# Patient Record
Sex: FEMALE | Race: WHITE | NOT HISPANIC OR LATINO | Employment: OTHER | URBAN - METROPOLITAN AREA
[De-identification: names, ages, dates, MRNs, and addresses within clinical notes are randomized per-mention and may not be internally consistent; named-entity substitution may affect disease eponyms.]

---

## 2017-10-18 ENCOUNTER — OFFICE VISIT (OUTPATIENT)
Dept: RHEUMATOLOGY | Facility: CLINIC | Age: 62
End: 2017-10-18
Payer: COMMERCIAL

## 2017-10-18 VITALS
HEIGHT: 67 IN | DIASTOLIC BLOOD PRESSURE: 86 MMHG | BODY MASS INDEX: 31.58 KG/M2 | WEIGHT: 201.19 LBS | SYSTOLIC BLOOD PRESSURE: 142 MMHG

## 2017-10-18 DIAGNOSIS — R22.9 SUBCUTANEOUS NODULES: Primary | ICD-10-CM

## 2017-10-18 PROCEDURE — 99203 OFFICE O/P NEW LOW 30 MIN: CPT | Mod: ,,, | Performed by: INTERNAL MEDICINE

## 2017-10-18 RX ORDER — HYOSCYAMINE SULFATE 0.12 MG/1
TABLET, ORALLY DISINTEGRATING ORAL
Refills: 3 | COMMUNITY
Start: 2017-09-18 | End: 2019-04-01

## 2017-10-18 RX ORDER — HYDROCORTISONE 25 MG/G
CREAM TOPICAL
COMMUNITY
Start: 2017-08-22 | End: 2023-06-01

## 2017-10-18 NOTE — PROGRESS NOTES
This patient was referred to me by Dr. Scar Mendez for the evaluation of tiny nodules that have appeared on her left anterior thigh and perhaps on the right forearm. These have been present over the last year and the patient feels that they may be slightly more tender than they had been.  There has been no growth of these lesions. They are not now tender. There have been no overlying skin abnormalities.  The patient entirely denies any symptoms of arthritis including any red, hot, swollen, or stiff joints.  The cursory review of systems does not suggest any systemic disease (eg., There has been no weight loss, no fevers, no loss of appetite, etc.) or rheumatologic process.  The superficial examination of the lesions on the left anterior leg demonstrate them to be very tiny, less than 5 mm, freely moveable, and non-tender.    I do not see any reason to evaluate her further for any autoimmune disease.     Instead, I have suggested that if she wishes to get a definitive diagnosis on these apparently benign nodules, that she contact a general surgeon who will be able to perform a biopsy of any one of these areas and give her a definitive diagnosis.    The patient was agreeable to the plan and was informed that she is always welcome to return should something change.

## 2019-04-01 ENCOUNTER — OFFICE VISIT (OUTPATIENT)
Dept: ORTHOPEDICS | Facility: CLINIC | Age: 64
End: 2019-04-01
Payer: MEDICARE

## 2019-04-01 VITALS
BODY MASS INDEX: 31.08 KG/M2 | SYSTOLIC BLOOD PRESSURE: 128 MMHG | HEIGHT: 67 IN | WEIGHT: 198 LBS | HEART RATE: 77 BPM | DIASTOLIC BLOOD PRESSURE: 80 MMHG

## 2019-04-01 DIAGNOSIS — M19.049 HAND ARTHRITIS: Primary | ICD-10-CM

## 2019-04-01 DIAGNOSIS — M79.642 BILATERAL HAND PAIN: ICD-10-CM

## 2019-04-01 DIAGNOSIS — M65.311 TRIGGER THUMB OF BOTH HANDS: ICD-10-CM

## 2019-04-01 DIAGNOSIS — M25.552 LEFT HIP PAIN: ICD-10-CM

## 2019-04-01 DIAGNOSIS — M65.312 TRIGGER THUMB OF BOTH HANDS: ICD-10-CM

## 2019-04-01 DIAGNOSIS — M79.641 BILATERAL HAND PAIN: ICD-10-CM

## 2019-04-01 PROCEDURE — 99203 OFFICE O/P NEW LOW 30 MIN: CPT | Mod: ,,, | Performed by: ORTHOPAEDIC SURGERY

## 2019-04-01 PROCEDURE — 99203 PR OFFICE/OUTPT VISIT, NEW, LEVL III, 30-44 MIN: ICD-10-PCS | Mod: ,,, | Performed by: ORTHOPAEDIC SURGERY

## 2019-04-01 PROCEDURE — 73502 X-RAY EXAM HIP UNI 2-3 VIEWS: CPT | Mod: LT,,, | Performed by: ORTHOPAEDIC SURGERY

## 2019-04-01 PROCEDURE — 73130 X-RAY EXAM OF HAND: CPT | Mod: 50,,, | Performed by: ORTHOPAEDIC SURGERY

## 2019-04-01 PROCEDURE — 73130 PR  X-RAY HAND 3+ VW: ICD-10-PCS | Mod: 50,,, | Performed by: ORTHOPAEDIC SURGERY

## 2019-04-01 PROCEDURE — 73502 PR X-RAY EXAM HIP UNI 2-3 VIEWS: ICD-10-PCS | Mod: LT,,, | Performed by: ORTHOPAEDIC SURGERY

## 2019-04-01 RX ORDER — IBUPROFEN 600 MG/1
600 TABLET ORAL EVERY 8 HOURS PRN
Qty: 60 TABLET | Refills: 2 | Status: SHIPPED | OUTPATIENT
Start: 2019-04-01 | End: 2019-04-21

## 2019-04-01 NOTE — PROGRESS NOTES
Subjective:       Patient ID: Mi Hancock is a 63 y.o. female.    Chief Complaint: Pain of the Left Hip (Left hip pain x 2 years. NO injury); Pain of the Left Hand (Bilateral hand pain x 1 year); and Pain of the Right Hand      History of Present Illness  This lady has a two-year history of left hip pain and x-rays a history of bilateral hand pain. Symptoms on a daily intermittent basis worse with activity.no numbness to hands noticed lumps on fingers    Current Medications  Current Outpatient Medications   Medication Sig Dispense Refill    hydrocortisone 2.5 % cream Apply  topically 2 (two) times daily.      ibuprofen (ADVIL,MOTRIN) 600 MG tablet Take 1 tablet (600 mg total) by mouth every 8 (eight) hours as needed for Pain. 60 tablet 2     No current facility-administered medications for this visit.        Allergies  Review of patient's allergies indicates:  No Known Allergies    Past Medical History  Past Medical History:   Diagnosis Date    Thyroid nodule        Surgical History  Past Surgical History:   Procedure Laterality Date    APPENDECTOMY      HYSTERECTOMY      melanoma Left     flank       Family History:   Family History   Problem Relation Age of Onset    Osteoarthritis Mother     Heart disease Father        Social History:   Social History     Socioeconomic History    Marital status:      Spouse name: Not on file    Number of children: Not on file    Years of education: Not on file    Highest education level: Not on file   Occupational History    Not on file   Social Needs    Financial resource strain: Not on file    Food insecurity:     Worry: Not on file     Inability: Not on file    Transportation needs:     Medical: Not on file     Non-medical: Not on file   Tobacco Use    Smoking status: Former Smoker     Packs/day: 2.00     Years: 10.00     Pack years: 20.00     Types: Cigarettes     Last attempt to quit:      Years since quittin.2    Smokeless tobacco: Never  Used   Substance and Sexual Activity    Alcohol use: Yes     Alcohol/week: 1.2 oz     Types: 2 Glasses of wine per week    Drug use: No    Sexual activity: Not on file   Lifestyle    Physical activity:     Days per week: Not on file     Minutes per session: Not on file    Stress: Not on file   Relationships    Social connections:     Talks on phone: Not on file     Gets together: Not on file     Attends Congregation service: Not on file     Active member of club or organization: Not on file     Attends meetings of clubs or organizations: Not on file     Relationship status: Not on file    Intimate partner violence:     Fear of current or ex partner: Not on file     Emotionally abused: Not on file     Physically abused: Not on file     Forced sexual activity: Not on file   Other Topics Concern    Not on file   Social History Narrative    Not on file       Hospitalization/Major Diagnostic Procedure:     Review of Systems     General/Constitutional:  Chills denies. Fatigue denies. Fever denies. Weight gain denies. Weight loss denies.    Respiratory:  Shortness of breath denies.    Cardiovascular:  Chest pain denies.    Gastrointestinal:  Constipation denies. Diarrhea denies. Nausea denies. Vomiting denies.     Hematology:  Easy bruising denies. Prolonged bleeding denies.     Genitourinary:  Frequent urination denies. Pain in lower back denies. Painful urination denies.     Musculoskeletal:  See HPI for details    Skin:  Rash denies.    Neurologic:  Dizziness denies. Gait abnormalities denies. Seizures denies. Tingling/Numbess denies.    Psychiatric:  Anxiety denies. Depressed mood denies.     Objective:   Vital Signs:   Vitals:    04/01/19 1344   BP: 128/80   Pulse: 77        Physical Exam      General Examination:     Constitutional: The patient is alert and oriented to lace person and time. Mood is pleasant.     Head/Face: Normal facial features normal eyebrows    Eyes: Normal extraocular motion  bilaterally    Lungs: Respirations are equal and unlabored    Gait is coordinated.    Cardiovascular: There are no swelling or varicosities present.    Lymphatic: Negative for adenopathy    Skin: Normal    Neurological: Level of consciousness normal. Oriented to place person and time and situation    Psychiatric: Oriented to time place person and situation    Examination of the hands show moderate tenderness of the DIP joints of both index fingers with slight prominence Norvasc his status normal Phalen's test negative examination shows tenderness of the greater trochanter full range of motion lumbar spine nontender straight leg raising negative moderate tenderness on the volar aspect of the MP joint of both thumbs with mild triggering noted    XRAY Report/ Interpretation : AP pelvis x-ray was taken today. Indications low back pain and/or hip pain. Findings AP pelvis x-ray appears to be normal with no evidence of fractures or significant degenerative disease  AP lateral x-rays both hands taken today show slight irregularity and early arthritic changes in the DIP joints of both index fingers      Assessment:       1. Hand arthritis    2. Bilateral hand pain    3. Left hip pain    4. Trigger thumb of both hands        Plan:       Mi was seen today for pain, pain and pain.    Diagnoses and all orders for this visit:    Hand arthritis    Bilateral hand pain  -     X-Ray Hand 3 View Bilateral    Left hip pain  -     X-Ray Hip 2 or 3 views Left    Trigger thumb of both hands    Other orders  -     ibuprofen (ADVIL,MOTRIN) 600 MG tablet; Take 1 tablet (600 mg total) by mouth every 8 (eight) hours as needed for Pain.         Follow up if symptoms worsen or fail to improve.    Treatment options were discussed she would prefer to be observed I did offer her a steroid injection to the trochanter bursa the left hip that she would prefer to be observed and try anti-inflammatory medication at this time she is from a with  ibuprofen    This note was created using Dragon voice recognition software that occasionally misinterpreted phrases or words.

## 2019-04-01 NOTE — PROGRESS NOTES
Subjective:       Patient ID: Mi Hancock is a 63 y.o. female.    Chief Complaint: Pain of the Left Hip (Left hip pain x 2 years. NO injury); Pain of the Left Hand (Bilateral hand pain x 1 year); and Pain of the Right Hand      History of Present Illness  ***    Current Medications  Current Outpatient Medications   Medication Sig Dispense Refill    hydrocortisone 2.5 % cream Apply  topically 2 (two) times daily.       No current facility-administered medications for this visit.        Allergies  Review of patient's allergies indicates:  No Known Allergies    Past Medical History  Past Medical History:   Diagnosis Date    Thyroid nodule        Surgical History  Past Surgical History:   Procedure Laterality Date    APPENDECTOMY      HYSTERECTOMY      melanoma Left     flank       Family History:   Family History   Problem Relation Age of Onset    Osteoarthritis Mother     Heart disease Father        Social History:   Social History     Socioeconomic History    Marital status:      Spouse name: Not on file    Number of children: Not on file    Years of education: Not on file    Highest education level: Not on file   Occupational History    Not on file   Social Needs    Financial resource strain: Not on file    Food insecurity:     Worry: Not on file     Inability: Not on file    Transportation needs:     Medical: Not on file     Non-medical: Not on file   Tobacco Use    Smoking status: Former Smoker     Packs/day: 2.00     Years: 10.00     Pack years: 20.00     Types: Cigarettes     Last attempt to quit:      Years since quittin.2    Smokeless tobacco: Never Used   Substance and Sexual Activity    Alcohol use: Yes     Alcohol/week: 1.2 oz     Types: 2 Glasses of wine per week    Drug use: No    Sexual activity: Not on file   Lifestyle    Physical activity:     Days per week: Not on file     Minutes per session: Not on file    Stress: Not on file   Relationships    Social  connections:     Talks on phone: Not on file     Gets together: Not on file     Attends Confucianism service: Not on file     Active member of club or organization: Not on file     Attends meetings of clubs or organizations: Not on file     Relationship status: Not on file    Intimate partner violence:     Fear of current or ex partner: Not on file     Emotionally abused: Not on file     Physically abused: Not on file     Forced sexual activity: Not on file   Other Topics Concern    Not on file   Social History Narrative    Not on file       Hospitalization/Major Diagnostic Procedure:     Review of Systems     General/Constitutional:  Chills denies. Fatigue denies. Fever denies. Weight gain denies. Weight loss denies.    Respiratory:  Shortness of breath denies.    Cardiovascular:  Chest pain denies.    Gastrointestinal:  Constipation denies. Diarrhea denies. Nausea denies. Vomiting denies.     Hematology:  Easy bruising denies. Prolonged bleeding denies.     Genitourinary:  Frequent urination denies. Pain in lower back denies. Painful urination denies.     Musculoskeletal:  See HPI for details    Skin:  Rash denies.    Neurologic:  Dizziness denies. Gait abnormalities denies. Seizures denies. Tingling/Numbess denies.    Psychiatric:  Anxiety denies. Depressed mood denies.     Objective:   Vital Signs:   Vitals:    04/01/19 1344   BP: 128/80   Pulse: 77        Physical Exam      General Examination:     Constitutional: The patient is alert and oriented to lace person and time. Mood is pleasant.     Head/Face: Normal facial features normal eyebrows    Eyes: Normal extraocular motion bilaterally    Lungs: Respirations are equal and unlabored    Gait is coordinated.    Cardiovascular: There are no swelling or varicosities present.    Lymphatic: Negative for adenopathy    Skin: Normal    Neurological: Level of consciousness normal. Oriented to place person and time and situation    Psychiatric: Oriented to time place  person and situation    ***  XRAY Report/ Interpretation:***      Assessment:       No diagnosis found.    Plan:       There are no diagnoses linked to this encounter.     No follow-ups on file.    ***    This note was created using Dragon voice recognition software that occasionally misinterpreted phrases or words.

## 2019-04-01 NOTE — PROGRESS NOTES
Subjective:       Patient ID: Mi Hancock is a 63 y.o. female.    Chief Complaint: Pain of the Left Hip (Left hip pain x 2 years. NO injury); Pain of the Left Hand (Bilateral hand pain x 1 year); and Pain of the Right Hand      History of Present Illness  ***    Current Medications  Current Outpatient Medications   Medication Sig Dispense Refill    hydrocortisone 2.5 % cream Apply  topically 2 (two) times daily.       No current facility-administered medications for this visit.        Allergies  Review of patient's allergies indicates:  No Known Allergies    Past Medical History  Past Medical History:   Diagnosis Date    Thyroid nodule        Surgical History  Past Surgical History:   Procedure Laterality Date    APPENDECTOMY      HYSTERECTOMY      melanoma Left     flank       Family History:   Family History   Problem Relation Age of Onset    Osteoarthritis Mother     Heart disease Father        Social History:   Social History     Socioeconomic History    Marital status:      Spouse name: Not on file    Number of children: Not on file    Years of education: Not on file    Highest education level: Not on file   Occupational History    Not on file   Social Needs    Financial resource strain: Not on file    Food insecurity:     Worry: Not on file     Inability: Not on file    Transportation needs:     Medical: Not on file     Non-medical: Not on file   Tobacco Use    Smoking status: Former Smoker     Packs/day: 2.00     Years: 10.00     Pack years: 20.00     Types: Cigarettes     Last attempt to quit:      Years since quittin.2    Smokeless tobacco: Never Used   Substance and Sexual Activity    Alcohol use: Yes     Alcohol/week: 1.2 oz     Types: 2 Glasses of wine per week    Drug use: No    Sexual activity: Not on file   Lifestyle    Physical activity:     Days per week: Not on file     Minutes per session: Not on file    Stress: Not on file   Relationships    Social  connections:     Talks on phone: Not on file     Gets together: Not on file     Attends Alevism service: Not on file     Active member of club or organization: Not on file     Attends meetings of clubs or organizations: Not on file     Relationship status: Not on file    Intimate partner violence:     Fear of current or ex partner: Not on file     Emotionally abused: Not on file     Physically abused: Not on file     Forced sexual activity: Not on file   Other Topics Concern    Not on file   Social History Narrative    Not on file       Hospitalization/Major Diagnostic Procedure:     Review of Systems     General/Constitutional:  Chills denies. Fatigue denies. Fever denies. Weight gain denies. Weight loss denies.    Respiratory:  Shortness of breath denies.    Cardiovascular:  Chest pain denies.    Gastrointestinal:  Constipation denies. Diarrhea denies. Nausea denies. Vomiting denies.     Hematology:  Easy bruising denies. Prolonged bleeding denies.     Genitourinary:  Frequent urination denies. Pain in lower back denies. Painful urination denies.     Musculoskeletal:  See HPI for details    Skin:  Rash denies.    Neurologic:  Dizziness denies. Gait abnormalities denies. Seizures denies. Tingling/Numbess denies.    Psychiatric:  Anxiety denies. Depressed mood denies.     Objective:   Vital Signs:   Vitals:    04/01/19 1344   BP: 128/80   Pulse: 77        Physical Exam      General Examination:     Constitutional: The patient is alert and oriented to lace person and time. Mood is pleasant.     Head/Face: Normal facial features normal eyebrows    Eyes: Normal extraocular motion bilaterally    Lungs: Respirations are equal and unlabored    Gait is coordinated.    Cardiovascular: There are no swelling or varicosities present.    Lymphatic: Negative for adenopathy    Skin: Normal    Neurological: Level of consciousness normal. Oriented to place person and time and situation    Psychiatric: Oriented to time place  person and situation    ***    XRAY Report/ Interpretation : ***      Assessment:       No diagnosis found.    Plan:       There are no diagnoses linked to this encounter.     No follow-ups on file.    ***    This note was created using Dragon voice recognition software that occasionally misinterpreted phrases or words.

## 2019-09-04 DIAGNOSIS — M79.604 RIGHT LEG PAIN: Primary | ICD-10-CM

## 2019-09-04 DIAGNOSIS — Z78.0 MENOPAUSE: ICD-10-CM

## 2019-09-04 DIAGNOSIS — Z12.39 SCREENING BREAST EXAMINATION: Primary | ICD-10-CM

## 2019-09-11 ENCOUNTER — HOSPITAL ENCOUNTER (OUTPATIENT)
Dept: RADIOLOGY | Facility: HOSPITAL | Age: 64
Discharge: HOME | End: 2019-09-11
Attending: FAMILY MEDICINE
Payer: MEDICARE

## 2019-09-11 VITALS — WEIGHT: 198 LBS | HEIGHT: 67 IN | BODY MASS INDEX: 31.08 KG/M2

## 2019-09-11 DIAGNOSIS — Z78.0 MENOPAUSE: ICD-10-CM

## 2019-09-11 DIAGNOSIS — Z12.39 SCREENING BREAST EXAMINATION: ICD-10-CM

## 2019-09-11 DIAGNOSIS — M79.604 RIGHT LEG PAIN: ICD-10-CM

## 2019-09-11 PROCEDURE — 77080 DXA BONE DENSITY AXIAL: CPT | Mod: TC,PO

## 2019-09-11 PROCEDURE — 93971 EXTREMITY STUDY: CPT | Mod: TC,PO,RT

## 2019-09-11 PROCEDURE — 77067 SCR MAMMO BI INCL CAD: CPT | Mod: TC,PO

## 2019-11-26 ENCOUNTER — TELEPHONE (OUTPATIENT)
Dept: OPHTHALMOLOGY | Facility: CLINIC | Age: 64
End: 2019-11-26

## 2019-11-26 NOTE — TELEPHONE ENCOUNTER
----- Message from Gris New sent at 11/26/2019 11:27 AM CST -----  Contact:  office  -please call above patient being referred over to the doctor waiting on a call to schedule appointment thanks.

## 2020-01-08 ENCOUNTER — OFFICE VISIT (OUTPATIENT)
Dept: OPHTHALMOLOGY | Facility: CLINIC | Age: 65
End: 2020-01-08
Payer: MEDICARE

## 2020-01-08 DIAGNOSIS — H18.599: Primary | ICD-10-CM

## 2020-01-08 DIAGNOSIS — H25.13 NUCLEAR SCLEROSIS, BILATERAL: ICD-10-CM

## 2020-01-08 PROCEDURE — 99999 PR PBB SHADOW E&M-EST. PATIENT-LVL II: CPT | Mod: PBBFAC,,, | Performed by: OPHTHALMOLOGY

## 2020-01-08 PROCEDURE — 99212 OFFICE O/P EST SF 10 MIN: CPT | Mod: PBBFAC | Performed by: OPHTHALMOLOGY

## 2020-01-08 PROCEDURE — 92004 PR EYE EXAM, NEW PATIENT,COMPREHESV: ICD-10-PCS | Mod: S$PBB,,, | Performed by: OPHTHALMOLOGY

## 2020-01-08 PROCEDURE — 92004 COMPRE OPH EXAM NEW PT 1/>: CPT | Mod: S$PBB,,, | Performed by: OPHTHALMOLOGY

## 2020-01-08 PROCEDURE — 99999 PR PBB SHADOW E&M-EST. PATIENT-LVL II: ICD-10-PCS | Mod: PBBFAC,,, | Performed by: OPHTHALMOLOGY

## 2020-01-08 RX ORDER — SODIUM CHLORIDE 50 MG/ML
1 SOLUTION/ DROPS OPHTHALMIC
COMMUNITY

## 2020-01-08 NOTE — Clinical Note
January 8, 2020      Iva Mazariegos MD  [address]  [address]  [place] [address]           [facility]  [facility]  [place] [address]  Phone: [phone]  Fax: [phone]          Patient: Mi Hancock   MR Number: [id number]   YOB: 1955   Date of Visit: 1/8/2020       Dear Dr. Iva Mazariegos:    Thank you for referring Mi Hancock to me for evaluation. Attached you will find relevant portions of my assessment and plan of care.    If you have questions, please do not hesitate to call me. I look forward to following Mi Hancock along with you.    Sincerely,    Linda Christensen MD    Enclosure  CC:  No Recipients    If you would like to receive this communication electronically, please contact [email or web address] or [phone] to request more information on [facility] [employer] access.    For providers and/or their staff who would like to refer a patient to [facility], please contact us through our one-stop-shop provider referral line, [facility], at [phone].    If you feel you have received this communication in error or would no longer like to receive these types of communications, please e-mail [email or web address]

## 2020-01-08 NOTE — LETTER
[facility] - Ophthal[facility]  Ophthalmology  [address]  [facility] [address]  Phone: [phone]  Fax: [phone]   January 8, 2020    Iva Mazariegos MD  [address]  [address] [place] [address]    Patient: Mi Hancock   MR Number: [id number]   YOB: 1955   Date of Visit: 1/8/2020       Dear Dr. Mazariegos :    Thank you for referring Mi Hancock to me for evaluation. 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  The endothelium is health with cell counts about 2500 c/mm2  Visual decline likely due to early cataract formation, monitor for now as still correctable to 20/20.     If you have questions, please do not hesitate to call me. I look forward to following Mi Hancock along with you.    Sincerely,        Linda Christensen MD       CC  No Recipients

## 2022-07-12 ENCOUNTER — OFFICE VISIT (OUTPATIENT)
Dept: ALLERGY | Facility: CLINIC | Age: 67
End: 2022-07-12
Payer: MEDICARE

## 2022-07-12 ENCOUNTER — LAB VISIT (OUTPATIENT)
Dept: LAB | Facility: HOSPITAL | Age: 67
End: 2022-07-12
Attending: ALLERGY & IMMUNOLOGY
Payer: MEDICARE

## 2022-07-12 VITALS — WEIGHT: 202.19 LBS | BODY MASS INDEX: 31.73 KG/M2 | HEIGHT: 67 IN

## 2022-07-12 DIAGNOSIS — J33.8 OTHER POLYP OF SINUS: ICD-10-CM

## 2022-07-12 DIAGNOSIS — J33.9 NASAL POLYPOSIS: ICD-10-CM

## 2022-07-12 DIAGNOSIS — R43.8 HYPOSMIA: ICD-10-CM

## 2022-07-12 DIAGNOSIS — J31.0 CHRONIC RHINITIS: Primary | ICD-10-CM

## 2022-07-12 DIAGNOSIS — J31.0 CHRONIC RHINITIS: ICD-10-CM

## 2022-07-12 LAB
BASOPHILS # BLD AUTO: 0.07 K/UL (ref 0–0.2)
BASOPHILS NFR BLD: 0.8 % (ref 0–1.9)
DIFFERENTIAL METHOD: ABNORMAL
EOSINOPHIL # BLD AUTO: 0.3 K/UL (ref 0–0.5)
EOSINOPHIL NFR BLD: 3.1 % (ref 0–8)
ERYTHROCYTE [DISTWIDTH] IN BLOOD BY AUTOMATED COUNT: 13 % (ref 11.5–14.5)
HCT VFR BLD AUTO: 39.6 % (ref 37–48.5)
HGB BLD-MCNC: 12.7 G/DL (ref 12–16)
IMM GRANULOCYTES # BLD AUTO: 0.02 K/UL (ref 0–0.04)
IMM GRANULOCYTES NFR BLD AUTO: 0.2 % (ref 0–0.5)
LYMPHOCYTES # BLD AUTO: 3 K/UL (ref 1–4.8)
LYMPHOCYTES NFR BLD: 36.1 % (ref 18–48)
MCH RBC QN AUTO: 31.4 PG (ref 27–31)
MCHC RBC AUTO-ENTMCNC: 32.1 G/DL (ref 32–36)
MCV RBC AUTO: 98 FL (ref 82–98)
MONOCYTES # BLD AUTO: 0.7 K/UL (ref 0.3–1)
MONOCYTES NFR BLD: 7.9 % (ref 4–15)
NEUTROPHILS # BLD AUTO: 4.3 K/UL (ref 1.8–7.7)
NEUTROPHILS NFR BLD: 51.9 % (ref 38–73)
NRBC BLD-RTO: 0 /100 WBC
PLATELET # BLD AUTO: 302 K/UL (ref 150–450)
PMV BLD AUTO: 10.5 FL (ref 9.2–12.9)
RBC # BLD AUTO: 4.04 M/UL (ref 4–5.4)
WBC # BLD AUTO: 8.33 K/UL (ref 3.9–12.7)

## 2022-07-12 PROCEDURE — 85025 COMPLETE CBC W/AUTO DIFF WBC: CPT | Performed by: ALLERGY & IMMUNOLOGY

## 2022-07-12 PROCEDURE — 36415 COLL VENOUS BLD VENIPUNCTURE: CPT | Mod: PO | Performed by: ALLERGY & IMMUNOLOGY

## 2022-07-12 PROCEDURE — 86003 ALLG SPEC IGE CRUDE XTRC EA: CPT | Mod: 59 | Performed by: ALLERGY & IMMUNOLOGY

## 2022-07-12 PROCEDURE — 99204 OFFICE O/P NEW MOD 45 MIN: CPT | Mod: S$PBB,,, | Performed by: ALLERGY & IMMUNOLOGY

## 2022-07-12 PROCEDURE — 99999 PR PBB SHADOW E&M-EST. PATIENT-LVL III: CPT | Mod: PBBFAC,,, | Performed by: ALLERGY & IMMUNOLOGY

## 2022-07-12 PROCEDURE — 99213 OFFICE O/P EST LOW 20 MIN: CPT | Mod: PBBFAC,PO | Performed by: ALLERGY & IMMUNOLOGY

## 2022-07-12 PROCEDURE — 99999 PR PBB SHADOW E&M-EST. PATIENT-LVL III: ICD-10-PCS | Mod: PBBFAC,,, | Performed by: ALLERGY & IMMUNOLOGY

## 2022-07-12 PROCEDURE — 86003 ALLG SPEC IGE CRUDE XTRC EA: CPT | Performed by: ALLERGY & IMMUNOLOGY

## 2022-07-12 PROCEDURE — 99204 PR OFFICE/OUTPT VISIT, NEW, LEVL IV, 45-59 MIN: ICD-10-PCS | Mod: S$PBB,,, | Performed by: ALLERGY & IMMUNOLOGY

## 2022-07-12 PROCEDURE — 82785 ASSAY OF IGE: CPT | Performed by: ALLERGY & IMMUNOLOGY

## 2022-07-12 RX ORDER — FLUTICASONE PROPIONATE 50 MCG
1 SPRAY, SUSPENSION (ML) NASAL DAILY
COMMUNITY
End: 2022-08-15

## 2022-07-12 RX ORDER — FEXOFENADINE HCL 60 MG
60 TABLET ORAL DAILY
COMMUNITY

## 2022-07-12 RX ORDER — ESTRADIOL 0.1 MG/G
0.5 CREAM VAGINAL
COMMUNITY
Start: 2022-06-15

## 2022-07-12 NOTE — PROGRESS NOTES
"ALLERGY & IMMUNOLOGY CLINIC - INITIAL CONSULTATION      HISTORY OF PRESENT ILLNESS     Patient ID: Mi Hancock is a 66 y.o. female    CC: query allergy    HPI: 65 yo woman presents for initial evaluation, she is self referred.     Since Spring, she has had itchy ears, ear fullness, ear itching, rhinorrhea (clear). Symptoms have been daily since spring. She constantly needs kleenex on hand. If she takes loratadine, she finds that it dries up the mucous and causes sinus congestion and pressure. She has also tried flonase, but if she uses this often, it causes a burning sensation in the nose and sinuses.     She has experienced similar symptoms in the past. She underwent allergy testing twice in the past. At age 20, she was positive to nearly everything (done in [place], [place]). She underwent allergy shots there for several years. This helped alleviate her symptoms. She then moved, and in 1995, she underwent additional allergy testing. She recalls citrus being positive. She again underwent shots but had to stop after a short time due to missing too much work.     Lemon and lime cause rhinorrhea.     No fevers or chills. Intermittent heartburn and gastritis, monitors diet but not on meds. Sense of smell is diminished. Diminished hearing in one ear     MEDICAL HISTORY     MedHx: active problems reviewed  SurgHx: no ENT surgeries  SocHx: dogs at home  FamHx: MGF with allergic rhinitis, brothers with rhinitis  Allergies: NKDA  Medications: MAR reviewed  Vaccines: She has not yet had pneumonia or flu vaccines    H/o Asthma:denies  H/o Eczema: denies  H/o Rhinitis: yes  Oral Allergy:  denies  Food Allergy: avoids citrus, when she handles raw potatoes, her hands turn red  Venom Allergy: denies  Latex Allergy: denies     PHYSICAL EXAM     VS: Ht 5' 7" (1.702 m)   Wt 91.7 kg (202 lb 2.6 oz)   BMI 31.66 kg/m²   GENERAL: alert, NAD, well-appearing, cooperative  EYES: PERRL, EOMI, no conjunctival injection, no discharge, no " infraorbital shiners  EARS: external auditory canals normal B/L, TM normal B/L  NOSE: NT 2+ and pink B/L, no stringing mucous, possible polypoid tissue vs mucus in bilateral nares - patient unable to blow anything out of nose at the time of exam  ORAL: MMM, no ulcers, no thrush, no cobblestoning, low lying palate  NECK: supple, trachea midline, no thyromegaly, no LAD  LUNGS: CTAB, no w/r/c, no increased WOB  HEART: RRR, normal S1/S2, no m/g/r  EXTREMITIES: +2 distal pulses, no c/c/e  LYMPHATICS: no cervical/submandibular LAD  DERM: no rashes, no skin breaks, no dystrophic fingernails  NEURO: normal gait, no facial asymmetry     LABORATORY STUDIES     2021: no eosinophilia, normal CBC, CMP     ALLERGEN TESTING     Skin Prick: done decades ago     ASSESSMENT & PLAN     Mi Hancock is a 66 y.o. female with     Chronic rhinitis  Possible nasal polyposis  Hyposmia  History of positive allergy testing in the past    Plan:   CT scan of sinuses  Continue flonase 1 SEN daily or BID, reviewed proper technique.   Continue loratadine for now (discouraged version with decongestant added)  Immunocaps to aeroallergens, total IgE level, and CBC with diff to look for eosinophilia.   Will titrate meds based on results    Follow up: will contact with results when available    Rupa Stoddard MD  Allergy/Immunology

## 2022-07-13 ENCOUNTER — HOSPITAL ENCOUNTER (OUTPATIENT)
Dept: RADIOLOGY | Facility: HOSPITAL | Age: 67
Discharge: HOME | End: 2022-07-13
Attending: ALLERGY & IMMUNOLOGY
Payer: MEDICARE

## 2022-07-13 DIAGNOSIS — J31.0 CHRONIC RHINITIS: ICD-10-CM

## 2022-07-13 DIAGNOSIS — J33.8 OTHER POLYP OF SINUS: ICD-10-CM

## 2022-07-13 DIAGNOSIS — J33.9 NASAL POLYPOSIS: ICD-10-CM

## 2022-07-13 DIAGNOSIS — R43.8 HYPOSMIA: ICD-10-CM

## 2022-07-13 LAB — IGE SERPL-ACNC: 40 IU/ML (ref 0–100)

## 2022-07-13 PROCEDURE — 70486 CT SINUSES WITHOUT CONTRAST: ICD-10-PCS | Mod: 26,,, | Performed by: RADIOLOGY

## 2022-07-13 PROCEDURE — 70486 CT MAXILLOFACIAL W/O DYE: CPT | Mod: TC

## 2022-07-13 PROCEDURE — 70486 CT MAXILLOFACIAL W/O DYE: CPT | Mod: 26,,, | Performed by: RADIOLOGY

## 2022-07-14 ENCOUNTER — TELEPHONE (OUTPATIENT)
Dept: ALLERGY | Facility: CLINIC | Age: 67
End: 2022-07-14
Payer: MEDICARE

## 2022-07-14 DIAGNOSIS — J32.0 CHRONIC MAXILLARY SINUSITIS: Primary | ICD-10-CM

## 2022-07-14 RX ORDER — AMOXICILLIN AND CLAVULANATE POTASSIUM 875; 125 MG/1; MG/1
1 TABLET, FILM COATED ORAL EVERY 12 HOURS
Qty: 42 TABLET | Refills: 0 | Status: SHIPPED | OUTPATIENT
Start: 2022-07-14 | End: 2022-08-04

## 2022-07-14 NOTE — TELEPHONE ENCOUNTER
Spoke with Mi to review results of CT scan - chronic pansinusitis. No polyps.    Augmentin Rx sent to pharmacy. ENT referral placed.     Will be in touch when allergy bloodwork results.

## 2022-07-15 LAB
A ALTERNATA IGE QN: <0.1 KU/L
A FUMIGATUS IGE QN: <0.1 KU/L
BERMUDA GRASS IGE QN: 1.66 KU/L
CAT DANDER IGE QN: <0.1 KU/L
CEDAR IGE QN: <0.1 KU/L
D FARINAE IGE QN: <0.1 KU/L
D PTERONYSS IGE QN: <0.1 KU/L
DEPRECATED A ALTERNATA IGE RAST QL: NORMAL
DEPRECATED A FUMIGATUS IGE RAST QL: NORMAL
DEPRECATED BERMUDA GRASS IGE RAST QL: ABNORMAL
DEPRECATED CAT DANDER IGE RAST QL: NORMAL
DEPRECATED CEDAR IGE RAST QL: NORMAL
DEPRECATED D FARINAE IGE RAST QL: NORMAL
DEPRECATED D PTERONYSS IGE RAST QL: NORMAL
DEPRECATED DOG DANDER IGE RAST QL: NORMAL
DEPRECATED ELDER IGE RAST QL: ABNORMAL
DEPRECATED ENGL PLANTAIN IGE RAST QL: ABNORMAL
DEPRECATED PECAN/HICK TREE IGE RAST QL: NORMAL
DEPRECATED ROACH IGE RAST QL: NORMAL
DEPRECATED TIMOTHY IGE RAST QL: ABNORMAL
DEPRECATED WEST RAGWEED IGE RAST QL: ABNORMAL
DEPRECATED WHITE OAK IGE RAST QL: ABNORMAL
DOG DANDER IGE QN: <0.1 KU/L
ELDER IGE QN: 0.11 KU/L
ENGL PLANTAIN IGE QN: 0.15 KU/L
PECAN/HICK TREE IGE QN: <0.1 KU/L
ROACH IGE QN: <0.1 KU/L
TIMOTHY IGE QN: 1.4 KU/L
WEST RAGWEED IGE QN: 0.11 KU/L
WHITE OAK IGE QN: 0.12 KU/L

## 2022-07-18 ENCOUNTER — PATIENT MESSAGE (OUTPATIENT)
Dept: ALLERGY | Facility: CLINIC | Age: 67
End: 2022-07-18
Payer: MEDICARE

## 2022-07-18 DIAGNOSIS — J32.0 CHRONIC MAXILLARY SINUSITIS: ICD-10-CM

## 2022-07-19 RX ORDER — AMOXICILLIN AND CLAVULANATE POTASSIUM 875; 125 MG/1; MG/1
1 TABLET, FILM COATED ORAL EVERY 12 HOURS
Qty: 42 TABLET | Refills: 0 | OUTPATIENT
Start: 2022-07-19 | End: 2022-08-09

## 2022-07-26 ENCOUNTER — PATIENT MESSAGE (OUTPATIENT)
Dept: ALLERGY | Facility: CLINIC | Age: 67
End: 2022-07-26
Payer: MEDICARE

## 2022-08-15 ENCOUNTER — OFFICE VISIT (OUTPATIENT)
Dept: OTOLARYNGOLOGY | Facility: CLINIC | Age: 67
End: 2022-08-15
Payer: MEDICARE

## 2022-08-15 VITALS — BODY MASS INDEX: 32.08 KG/M2 | WEIGHT: 204.38 LBS | HEIGHT: 67 IN | TEMPERATURE: 98 F

## 2022-08-15 DIAGNOSIS — J34.2 DEVIATED SEPTUM: ICD-10-CM

## 2022-08-15 DIAGNOSIS — J32.4 CHRONIC PANSINUSITIS: ICD-10-CM

## 2022-08-15 DIAGNOSIS — H93.8X1 SENSATION OF FULLNESS IN RIGHT EAR: ICD-10-CM

## 2022-08-15 DIAGNOSIS — H91.90 HEARING LOSS, UNSPECIFIED HEARING LOSS TYPE, UNSPECIFIED LATERALITY: ICD-10-CM

## 2022-08-15 DIAGNOSIS — J30.1 SEASONAL ALLERGIC RHINITIS DUE TO POLLEN: ICD-10-CM

## 2022-08-15 DIAGNOSIS — J32.0 CHRONIC MAXILLARY SINUSITIS: Primary | ICD-10-CM

## 2022-08-15 DIAGNOSIS — J34.3 NASAL TURBINATE HYPERTROPHY: ICD-10-CM

## 2022-08-15 DIAGNOSIS — H81.90 EPISODIC RECURRENT VERTIGO: ICD-10-CM

## 2022-08-15 PROCEDURE — 99204 PR OFFICE/OUTPT VISIT, NEW, LEVL IV, 45-59 MIN: ICD-10-PCS | Mod: S$PBB,,, | Performed by: OTOLARYNGOLOGY

## 2022-08-15 PROCEDURE — 99213 OFFICE O/P EST LOW 20 MIN: CPT | Mod: PBBFAC,PO | Performed by: OTOLARYNGOLOGY

## 2022-08-15 PROCEDURE — 99999 PR PBB SHADOW E&M-EST. PATIENT-LVL III: CPT | Mod: PBBFAC,,, | Performed by: OTOLARYNGOLOGY

## 2022-08-15 PROCEDURE — 99204 OFFICE O/P NEW MOD 45 MIN: CPT | Mod: S$PBB,,, | Performed by: OTOLARYNGOLOGY

## 2022-08-15 PROCEDURE — 99999 PR PBB SHADOW E&M-EST. PATIENT-LVL III: ICD-10-PCS | Mod: PBBFAC,,, | Performed by: OTOLARYNGOLOGY

## 2022-08-15 RX ORDER — AZELASTINE 1 MG/ML
2 SPRAY, METERED NASAL 2 TIMES DAILY PRN
Qty: 30 ML | Refills: 5 | Status: SHIPPED | OUTPATIENT
Start: 2022-08-15 | End: 2023-06-01

## 2022-08-15 RX ORDER — TRIAMCINOLONE ACETONIDE 55 UG/1
2 SPRAY, METERED NASAL DAILY
Qty: 16.9 ML | Refills: 5 | Status: SHIPPED | OUTPATIENT
Start: 2022-08-15

## 2022-08-15 NOTE — PROGRESS NOTES
"  [facility]    Subjective:      Patient: Mi Hancock Patient PCP: Scar Mendez Iv, MD         :  1955     Sex:  female      MRN:  [id number]          Date of Visit: 08/15/2022      Chief Complaint: Sinus Problem (States has had "allergy problems for years". States grass and trees are the main ones. States that Allergist had pt do CT Scan Sinus on 22-results in Epic. States that was told the right side is not draining. ) and Ear Fullness (States has pressure in the right ear all of the time. States does get vertigo on occasion because of the ear pressure. Pressure is worse in the morning.)      Patient ID: Mi Hancock is a 66 y.o. female former 40 pack year smoker quit in  with allergy issues her whole life poorly controlled with INS and Allegra referred to me by Dr. Rupa Stoddard in consultation for chronic sinusitis.  CT 22 images reviewed with right septal deviation/spurring and right greater than left maxillary sinusitis with acute on chronic inflammatory changes including partial opacification of most of the ethmoids into a limited degree of the anterior aspects of the sphenoid sinuses with some retained debris in the left sphenoid sinus.  Small frontal sinuses which other than the recesses appear to be uninvolved with any chronic mucoperiosteal thickening.  No middle ear or mastoid effusion appreciated.  Treated with Augmentin after CT findings consistent with acute on chronic sinusitis with right maxillary fluid level.  ImmunoCAP CBC and IgE ordered with normal IgE levels and findings of rag weed, marsh elder, oak, English plantain class 0/1 IgE and, Mehul and Bermuda grass class to IgE elevations.    Patient reports lifelong nasal congestion/obstruction.  She had some improvement in a feeling of fluid and pressure/fullness of the right maxillary sinus with the Augmentin but continues to feel the inability to clear the right ear as well as generalized nasal congestion and " bothersome postnasal drip with associated hoarseness.  She has used Flonase for years without resolution has never tried a different nasal steroid spray.  She has used air supply saline before but never a routine sinus rinsing regimen.  No history of intranasal antihistamine use, steroids or montelukast.  Complains of snoring without gross apnea symptoms.  No nasal pain or bleeding.  No ocular symptoms.          EXAMINATION:  CT SINUSES WITHOUT CONTRAST     CLINICAL HISTORY:  Sinonasal polyposis;  Chronic rhinitis     TECHNIQUE:  Axial low-dose images, coronal and sagittal reformations were performed through the paranasal sinuses.  Contrast was not administered.     COMPARISON:  None.     FINDINGS:  There is near complete opacification of the right maxillary sinus.  Mild circumferential mucoperiosteal thickening of the left maxillary sinus measuring up to 2.1 mm.     Mild dependent mucoperiosteal thickening of the frontal sinus measuring up to a maximum thickness of 2.9 mm.  Mild dependent mucoperiosteal thickening of the sphenoid sinus measuring to a maximum thickness of 2.6 mm.     Prominent mucoperiosteal thickening throughout the ethmoid air cells measuring up to 2.8 mm.     Mild rightward deviation of the nasal septum with a bony spur at the apex.  The right and left ostiomeatal complexes are obscured by retained secretions.  Small right lamellar cell.  There is mild to moderate turbinate hypertrophy.     Visualized mastoid air cells and middle ears are normally pneumatized.     Impression:     1. Chronic pansinusitis most predominant within the right maxillary sinus.  2. Nasal septal deviation.  3. Turbinate hypertrophy.        Electronically signed by: Michael Singleton    Review of Systems   Constitutional: Negative.    HENT: Positive for hearing loss and sinus pressure.    Eyes: Positive for itching.   Respiratory: Positive for cough and shortness of breath.    Cardiovascular: Negative.    Gastrointestinal:  Negative.    Endocrine: Negative.    Genitourinary: Negative.    Musculoskeletal: Positive for neck pain.   Skin: Negative.    Neurological: Positive for dizziness, light-headedness and headaches.   Hematological: Negative.    Psychiatric/Behavioral: Negative.         Past Medical History  She has a past medical history of Allergy, Angio-edema, Melanoma, and Thyroid nodule.    Family / Surgical / Social History  Her family history includes Allergies in her brother; Breast cancer in her maternal aunt; Heart disease in her father; Osteoarthritis in her mother.    Past Surgical History:   Procedure Laterality Date    APPENDECTOMY      HYSTERECTOMY      melanoma Left     flank       Social History     Tobacco Use    Smoking status: Former Smoker     Packs/day: 2.00     Years: 10.00     Pack years: 20.00     Types: Cigarettes     Quit date:      Years since quittin.    Smokeless tobacco: Never Used   Substance and Sexual Activity    Alcohol use: Yes     Alcohol/week: 2.0 standard drinks     Types: 2 Glasses of wine per week    Drug use: No    Sexual activity: Not Currently     Partners: Male       Medications  She has a current medication list which includes the following prescription(s): estradiol, fexofenadine, fluticasone propionate, hydrocortisone, and sodium chloride 5%.      Allergies  Review of patient's allergies indicates:  No Known Allergies    All medications, allergies, and past history have been reviewed.    Objective:      Vitals:  Vitals - 1 value per visit 2022 8/15/2022 8/15/2022   SYSTOLIC - - -   DIASTOLIC - - -   Pulse - - -   Temp - - 98.1   Weight (lb) 202.16 - 204.37   Weight (kg) 91.7 - 92.7   Height 67 - 67   BMI (Calculated) 31.7 - 32   VISIT REPORT - - -   Pain Score  - 0 -       Body surface area is 2.09 meters squared.    Physical Exam:    GENERAL  APPEARANCE -  alert, appears stated age, cooperative and mildly obese  BARRIER(S) TO COMMUNICATION -  none VOICE -  hyponasality, slightly coarse    INTEGUMENTARY  no suspicious head and neck lesions    HEENT  HEAD: Normocephalic, without obvious abnormality, atraumatic  FACE: INSPECTION - Symmetric, no signs of trauma, no suspicious lesion(s)  PALPATION -  No masses SALIVARY GLANDS - non-tender with no appreciable mass  STRENGTH - facial symmetry  NECK/THYROID: normal atraumatic, no neck masses, normal thyroid, no jvd    EYES  Normal occular alignment and mobility with no visible nystagmus at rest    EARS/NOSE/MOUTH/THROAT  EARS  PINNAE AND EXTERNAL EARS - no suspicious lesion OTOSCOPIC EXAM (surgical microscopy was not used for visualization/instrumentation): EAR EXAM - dull with mild retraction bilaterally  HEARING - adequate at conversation    NOSE AND SINUSES  EXTERNAL NOSE - Grossly normal for age/sex  SEPTUM - deviation and spurring right > 75% TURBINATES - left > right 3-4+ hypertrophy MUCOSA - pink/pale, mild edema, no anterior polyps or pus     MOUTH AND THROAT   ORAL CAVITY, LIPS, TEETH, GUMS & TONGUE - moist, no suspicious lesions  OROPHARYNX /TONSILS/PHARYNGEAL WALLS/HYPOPHARYNX - no erythema or exudates  NASOPHARYNX - limited mirror exam - unable to visualize due to anatomy/gag  LARYNX -  - limited mirror exam - unable to visualize due to anatomy/gag      CHEST AND LUNG   INSPECTION & AUSCULTATION - normal effort, no stridor    CARDIOVASCULAR  AUSCULTATION & PERIPHERAL VASCULAR - regular rate and rhythm.    NEUROLOGIC  MENTAL STATUS - alert, interactive CRANIAL NERVES - normal    LYMPHATIC  HEAD AND NECK - non-palpable; SUPRACLAVICULAR - deferred; AXILLARY - deferred; INGUINAL - deferred; LIVER/SPLEEN - deferred        Procedure(s):  None    Labs:  WBC   Date Value Ref Range Status   07/12/2022 8.33 3.90 - 12.70 K/uL Final     Hemoglobin   Date Value Ref Range Status   07/12/2022 12.7 12.0 - 16.0 g/dL Final     Platelets   Date Value Ref Range Status   07/12/2022 302 150 - 450 K/uL Final         Assessment:       Problem List Items Addressed This Visit        ENT    Seasonal allergic rhinitis due to pollen    Nasal turbinate hypertrophy    Deviated septum    Chronic pansinusitis    Hearing loss    Sensation of fullness in right ear    Chronic maxillary sinusitis - Primary    Episodic recurrent vertigo               Plan:        While surgical intervention including septoplasty, turbinate reductions, and pan sinus surgery (frontal balloon dilation only) is recommended and appropriate, we will pursue a longer course of medical therapy before electing to proceed with surgical treatment as an interval step in the management of chronic sinusitis as discussed.    Oral steroids can be prescribed the symptoms are not improving adequately.  For now we will change the nasal steroid from the long-term use of fluticasone/Flonase to triamcinolone/Nasacort again applied 1 spray towards the ear each side twice daily, this is to follow twice daily sinus rinses as discussed and outlined, and Astelin nasal spray can be added as needed with the pros and cons of its use as discussed.    Immunotherapy for pollen allergy to be discussed further with Dr. Aguilar hines but there appears to be a reasonably mild degree of pollen allergy.    Audiologic evaluation for right-sided ear fullness without evidence of effusion (fluid) and episodic transient vertigo is recommended.  Further evaluation of vertigo including central neurologic imaging and vestibular testing may prove necessary.    Follow-up in 1 month for assessment of chronic symptom response to medical therapy as well as review of audiologic testing.  Answers for HPI/ROS submitted by the patient on 8/14/2022  Eye Drainage?: Yes  Snoring?: Yes  Muscle aches / pain?: Yes  Seasonal Allergies?: Yes

## 2022-08-15 NOTE — PATIENT INSTRUCTIONS
While surgical intervention including septoplasty, turbinate reductions, and pan sinus surgery (frontal balloon dilation only) is recommended and appropriate, we will pursue a longer course of medical therapy before electing to proceed with surgical treatment as an interval step in the management of chronic sinusitis as discussed.    Oral steroids can be prescribed the symptoms are not improving adequately.  For now we will change the nasal steroid from the long-term use of fluticasone/Flonase to triamcinolone/Nasacort again applied 1 spray towards the ear each side twice daily, this is to follow twice daily sinus rinses as discussed and outlined, and Astelin nasal spray can be added as needed with the pros and cons of its use as discussed.    Immunotherapy for pollen allergy to be discussed further with Dr. Aguilar hines but there appears to be a reasonably mild degree of pollen allergy.    Audiologic evaluation for right-sided ear fullness without evidence of effusion (fluid) and episodic transient vertigo is recommended.  Further evaluation of vertigo including central neurologic imaging and vestibular testing may prove necessary.    Follow-up in 1 month for assessment of chronic symptom response to medical therapy as well as review of audiologic testing.        NASAL SALINE    Still saline comes in many preparations including sprays/mists, gels, and rinses.  Different preparations served different purposes.  Saline spray helps to briefly moisturize the nose and help clear mucus.  Saline gels coat the nose for longer protective benefit of keeping the linings the nose moist.  Saline rinses clear the nose and sinuses and a more thorough way in her best used for significant postnasal drip and sinus complaints.  A combination of saline sprays/mists, gels and rinses should be used to address routine nasal clearing and dryness issues as well as flushing for better control of allergy and postnasal drip symptoms.  There is  no real risk of over use of nasal saline products.  Saline sprays do not have any of the potential rebound or addiction of nasal decongestant sprays.  Nasal saline sprays and rinses should be used prior to the application of any medicated nasal sprays such as nasal steroids or nasal antihistamine sprays.        INTRANASAL STEROID SPRAYS      Intranasal steroid sprays are available both by prescription and over-the-counter both in generic and brand name preparations.  They are all very similar in efficacy and side effect profiles.  Over-the-counter and prescription intranasal steroids include fluticasone propionate (Flonase), fluticasone furoate (Sensimist), triamcinolone (Nasacort), and budesonide (Rhinocort).  While these medications are available as prescriptions as well there are few nasal steroids in her available by prescription only and include mometasone (Nasonex), flunisolide (Nasarel), and beclomethasone (QNASL).    Nasal steroids or the foundation of treatment of both allergy and other inflammatory conditions of the nose and sinuses.  They are safe for regular use and while there are many side effects listed most of these are steroid class effects and not typically encountered.  Typical side effects include dryness and even ulceration and bleeding of the nose.  These side effects can be minimized by proper application and proper moisturization with saline and saline gel.    Sometimes changing between 1 brand of nasal steroid and another can result in improved control of symptoms especially after long term use of one particular nasal steroid.    Proper application of the nasal steroid spray is accomplished by spraying towards the I/ear on the same side with the tip of the superior just barely inside the nostril with the chin slightly downward.  Any dripping should be gently inhaled not sniff test backwards into the throat.  Labeled instructions should be followed.          INTRANASAL STEROID  SPRAYS      Intranasal steroid sprays are available both by prescription and over-the-counter both in generic and brand name preparations.  They are all very similar in efficacy and side effect profiles.  Over-the-counter and prescription intranasal steroids include fluticasone propionate (Flonase), fluticasone furoate (Sensimist), triamcinolone (Nasacort), and budesonide (Rhinocort).  While these medications are available as prescriptions as well there are few nasal steroids in her available by prescription only and include mometasone (Nasonex), flunisolide (Nasarel), and beclomethasone (QNASL).    Nasal steroids or the foundation of treatment of both allergy and other inflammatory conditions of the nose and sinuses.  They are safe for regular use and while there are many side effects listed most of these are steroid class effects and not typically encountered.  Typical side effects include dryness and even ulceration and bleeding of the nose.  These side effects can be minimized by proper application and proper moisturization with saline and saline gel.    Sometimes changing between 1 brand of nasal steroid and another can result in improved control of symptoms especially after long term use of one particular nasal steroid.    Proper application of the nasal steroid spray is accomplished by spraying towards the I/ear on the same side with the tip of the superior just barely inside the nostril with the chin slightly downward.  Any dripping should be gently inhaled not sniff test backwards into the throat.  Labeled instructions should be followed.        SINUS RINSE INSTRUCTIONS    Nasal Saline Irrigation Instructions  You can wash your nasal and sinus passages using nasal saline (salt water) irrigation. This   is simple and effective. Follow the instructions below, as well as the ones provided by your   physician.  Supplies  First, you will need a nasal saline irrigation bottle and rinse solution.   You can  purchase nasal rinse kits that include these items (such as   NeilMed®, Ayr®, Simply Saline®, Ocean Complete®) at most drug   stores. You can also make your own saline irrigation solution by   adding kosher (non-iodine) salt and baking soda to distilled water.   Your physician may tell you to add medications like a steroid or   antibiotic to the rinse as needed.  Steps for nasal irrigation  Step 1. Fill the bottle  ? Wash your hands.  ? Fill the irrigation bottle with lukewarm distilled water or boiled water that has cooled.  Step 2. Mix the solution  ? Put the saline and salt packet contents into the bottle.  ? Tighten the top of the bottle and shake it gently to dissolve the mixture.  ? If you are making your own solution:   - Add 1/4 to 1/2 teaspoon of baking soda and 1/8 teaspoon of kosher (non-iodine) salt   into the bottle.   - Tighten the top of the bottle.   - Shake the bottle gently to dissolve the mixture.  Step 3. Get into position  ?  front of the sink.  ? Unless you were instructed to use another position, bend forward.   Then tilt your face down about 45 degrees so that you are looking   down into the sink.  ? Gently place the spout of the saline bottle against 1 of your nostrils.  [facility]  CARE AND TREATMENT  Patient Education  ©2018 [employer]  ©2018 [employer]  Step 4. Rinse  ? Breathing through your mouth, gently squeeze the   bottle. This will squirt the solution into your nostril. The   solution will start to drain from the other nostril. Some   may drain from your mouth. This is normal.  ? Use 2 ounces (half of the bottle) on each nostril.  ? Afterwards, you may need to blow your nose gently to   help drain any solution that is left behind.  Step 5. Repeat  ? Repeat steps 3 and 4 with the other nostril.  You can watch a video to learn how to do nasal saline irrigation. Go to [email or web address] and   search for Moontoast  Rinse.  Step 6.  Clean the bottle and cap. Air dry the Sinus Rinse bottle, cap, and tube on a clean paper towel, a lint free towel, or use NeilMed® NasaDOCK® or NasaDOCK plus (sold separately) to store the bottle, cap and tube.  Please read Warnings before using.  Our recommendation is to replace the bottle every three months.      NEILMED CLEEDGARG INSTRUCTIONS    It is very important to keep these devices clean and free from any contamination. Replace the bottle every 3 months.  NasaDock Plus  NasaDock NeilMed® SINUS RINSE Squeeze Bottle: Please perform routine inspections of the bottle and tube for any discolorations and cracks. If there are any visual signs of deterioration or permanent color changes, please clean thoroughly. If the discolorations remain after cleansing, discard the items and purchase new ones. Please follow these instructions after each use of the product. Be sure to replace your product after three months.  Step 1: Rinse the cap, tube and bottle using running water. Fill the bottle with distilled, micro-filtered (through 0.2 micron), reverse osmosis filtered, commercially bottled or previously boiled and cooled down water at lukewarm or body temperature..  Step 2: Add a few drops of dish washing liquid or baby shampoo.  Step 3: Attach the cap and tube to the bottle; hold your finger over the opening in the cap and shake the bottle vigorously.  Step 4: Squeeze the bottle hard to allow the soapy solution to clean the interior of the tube and the cap. Empty out the bottle completely.  Step 5: Rinse the soap from the bottle, cap and tube thoroughly and place the items on a clean paper towel to dry or use the preferred NasaDOCK® or NasaDOCK plus.    The NasaDOCK® is a simple, hygienic way to dry and store the SINUS RINSE bottle, cap and tube. NasaDOCK® comes with various hanging options and is available in different colors. Our newest model also offers storage for our SINUS RINSE mixture  packets. We strongly suggest using NasaDOCK® as an inexpensive, easy way to dry the cap, tube and SINUS RINSE bottle.        Cleaning:  Do not use a  to clean the inside of a bottle. While our bottle is  safe, a  will not adequately clean the SINUS RINSE bottle. The water jets in dishwashers cannot enter the narrow neck of the bottle, and portions of the bottles interior will not be cleaned thoroughly. Additional methods of cleaning the bottle include the use of concentrated white vinegar or isopropyl alcohol (70% concentration), followed by scrubbing and rinsing as described above.       Microwave Disinfection  Clean the device with soap and water as mentioned above and shake off the excess water. Now place the bottle, cap and tube in the microwave for 40 seconds. This will disinfect the bottle, cap and tube. If the microwave has been used recently, please make sure that the inside of the microwave has cooled back down to room temperature before using it to disinfect the bottle.    NeilMed NasaFlo® Neti Pot Users:  Use the same procedure as above.    Sinugator® Cleaning Directions:  Clean the Sinugator® by running plain water and dry with a clean lint free towel and then air dry the unit by keeping it open to the air. The nasal  tip, blue reservoir and white soft tube can be disinfected by cleaning with soap and water and shaking off the excess water before placing in the home microwave for 60 seconds. Clean the entire unit with a few drops of dishwashing liquid and water every three days to keep the unit clean. As a fi nal rinse to wash off any residual soap or tap water, use either distilled, micro-filtered (through 0.2 micron filter), commercially bottled or previously boiled & cooled down water. Please make sure to rinse thoroughly during each wash so no soap is left behind. DO NOT place the white motor unit in microwave for disinfection. Because of the units  stainless steel components, this can cause damage or fire hazards.    General Principles of Maintenance & Storage:  When permissible use a microwave periodically to disinfect devices. Always store NeilMed® products in a cool and dry place with adequate ventilation. NasaDOCK® or NasaDOCK plus offer a simple hygienic way to air dry & neatly store the bottle, cap, tube and NasaFlo. Do not store the bottle with the cap screwed on, unless both are dry. Do not store the wet parts in a sealed plastic bag. If you travel before they are dry, wrap parts separately in paper towels. Hand soap or shampoo can be used for cleaning parts while away from home.        USE ONLY AS DIRECTED, IF SYMPTOMS PERSIST SEE YOUR DOCTOR/HEALTHCARE PROFESSIONAL. ALWAYS READ THE LABEL.      INSTRUCTIONS TO FOLLOW AFTER SINUS AND NASAL SURGERY  DR. SCHNEIDER - [facility]    Office hours:  Weekdays 8:00 am to 5:00 pm.  Please call [phone] and ask to speak with his nurse or medical assistant.    After-hours & weekends:  Please call [phone] and ask to speak with Dr. Harrison or the ENT physician on call.    Your first office visit with Dr. Harrison after surgery should have been already scheduled.  If you dont know when it is, call Dr. Ruffin nurse or medical assistant at [phone].    Please call immediately if you have:    Temperature of 101° F or greater  Any unusual, painful swelling  Any active bleeding that saturates more than a 4x4 gauze  Any thick drainage green or yellow drainage  Changes in vision or swelling around the eye  Pain not relieved by your prescribed pain medication    ACTIVITY:    Sleep on your back with the head of the bead elevated, up on 2-3 pillows, or in a recliner for the first 3 to 5 days. This will help with swelling.     After surgery you may have a lot of nasal drainage. This is normal. You may breathe through your nose if youre able but avoid inhaling forcibly. Let all drainage fall on your  mustache dressing and change it as needed.    You may wake up after surgery with thick white stockings on. Wear them until you are walking around more. It is important to walk around often while at home to keep your blood circulating and prevent blood clots.    If you use CPAP or BiPAP to sleep at night, you should wait at least 48 hours before resuming use. Dr. Harrison will advise you when it is safe to do this.    You may shower 24 hours after surgery.    RESTRICTED ACTIVITIES AFTER SURGERY:    DO NOT blow your nose for 2 weeks. The only exception is that you may lightly blow your nose after using the sinus rinse. If you have to sneeze or cough, do so with your mouth open.     AVOID all heavy lifting, straining or bending for 2 weeks.     AVOID any sexual activity for 2 weeks after surgery.    AVOID semi-contact sports or vigorous exercising for 3-4 weeks. Dr. Harrison will let you know when you are cleared to resume exercise.    AVOID flying or swimming for 2 weeks.      DO NOT operate a motor vehicle or any type of heavy machinery within 24 hours of taking prescription pain medication.    DO NOT smoke or be around smokers.    AVOID irritating substances that might make you sneeze, such as dust, chalk, harsh chemicals, and allergic triggers. This might also include spicy foods.    DRESSINGS:    Change the gauze mustache dressing under your nose as needed. (If unsure what this dressing is or how to do this, ask your doctor or nurse before you leave the hospital.) You may have pinkish-red drainage for 2-3 days.    Usually there is no gauze packing placed inside the nose.  If packing is necessary, you will be informed by your surgeon.  Do not touch or pull at the packing. The packing will be removed by your doctor at your first visit after surgery.     You may also have a dissolvable stent or dissolvable sponge placed into the sinuses during surgery.  These usually do not need to be removed unless you are told  otherwise by Dr. Harrison.  You may notice small fragments of these items come out of your nose in the weeks following surgery.    MEDICATIONS:    After surgery, you will be sent home with prescriptions for pain medication and an anti-nausea medication. Antibiotics are usually not necessary.    Most people need pain medication for the first few days after surgery, although a narcotic is rarely necessary. The best pain control comes from a combination of ACETAMINOPHEN (Tylenol) and IBUPROFEN (Motrin). You will be given prescriptions for these at the recommended dose. These can be alternated so that you are taking something every 2 or 3 hours.    Some people have problems with bowel movements after surgery. If you have NOT had a bowel movement 3-5 days after surgery, go to your local pharmacy and purchase an over the counter stool softener such as COLACE. You can also ask the pharmacist for his or her recommendation. If you still do not have a bowel movement after starting the softener, please call the office.    You may be given an ointment called MUPIROCIN to use after surgery if you also had septal surgery. If you are given this, use a cotton swab to apply gently inside the nostrils. Do this 2 times a day for 1 week.    You will need these over-the-counter medications after surgery:    SALINE SINUS RINSE (Francisco Med brand): You will use this to rinse out your nose and sinuses after surgery. Begin doing this the day after surgery, unless instructed otherwise by Dr. Harrison.  You should do this 2 times a day, following the instructions on the box.    AFRIN (regular strength): Only use if you have nasal congestion or bleeding. Use 2 times per day for 3 days, stop for 1 day, continue 2 times per day for 3 days, AND NO MORE, then stop completely.  NOTE:  You may not need to do this at all.    DIET:    Avoid hot and spicy foods for 1 week after surgery.    Begin with bland foods the evening after surgery and advance to  your regular diet as tolerated. It is not necessary to take only soft food unless you are recovering from tonsil surgery.    Drink plenty of fluids (water is best).     Avoid alcoholic and caffeinated beverages for 1 week after surgery(unless you are a habitual drinker at risk of withdrawal) because they can cause you to become dehydrated.

## 2022-09-19 ENCOUNTER — TELEPHONE (OUTPATIENT)
Dept: OTOLARYNGOLOGY | Facility: CLINIC | Age: 67
End: 2022-09-19
Payer: MEDICARE

## 2022-09-19 NOTE — TELEPHONE ENCOUNTER
Called pt to see if she had her Audiogram done that was ordered by Dr. Harrison? Pt is scheduled for follow up on 9/21/22 in clinic. Pt states that she did not get it done yet, and that she was actually going to call and reschedule the appt with Dr. Harrison. Pt asked that we reschedule her appt to October. Appt rescheduled to 10/17/22 at 1 pm. Thanks, Lizzie

## 2022-10-17 ENCOUNTER — OFFICE VISIT (OUTPATIENT)
Dept: OTOLARYNGOLOGY | Facility: CLINIC | Age: 67
End: 2022-10-17
Payer: MEDICARE

## 2022-10-17 VITALS — WEIGHT: 205.69 LBS | BODY MASS INDEX: 32.28 KG/M2 | TEMPERATURE: 98 F | HEIGHT: 67 IN

## 2022-10-17 DIAGNOSIS — J32.4 CHRONIC PANSINUSITIS: ICD-10-CM

## 2022-10-17 DIAGNOSIS — J30.1 SEASONAL ALLERGIC RHINITIS DUE TO POLLEN: ICD-10-CM

## 2022-10-17 DIAGNOSIS — J34.3 NASAL TURBINATE HYPERTROPHY: ICD-10-CM

## 2022-10-17 DIAGNOSIS — H81.90 EPISODIC RECURRENT VERTIGO: ICD-10-CM

## 2022-10-17 DIAGNOSIS — H93.8X1 SENSATION OF FULLNESS IN RIGHT EAR: ICD-10-CM

## 2022-10-17 DIAGNOSIS — J32.0 CHRONIC MAXILLARY SINUSITIS: Primary | ICD-10-CM

## 2022-10-17 DIAGNOSIS — J34.2 DEVIATED SEPTUM: ICD-10-CM

## 2022-10-17 DIAGNOSIS — H91.90 HEARING LOSS, UNSPECIFIED HEARING LOSS TYPE, UNSPECIFIED LATERALITY: ICD-10-CM

## 2022-10-17 PROCEDURE — 99213 OFFICE O/P EST LOW 20 MIN: CPT | Mod: PBBFAC,PO | Performed by: OTOLARYNGOLOGY

## 2022-10-17 PROCEDURE — 99214 PR OFFICE/OUTPT VISIT, EST, LEVL IV, 30-39 MIN: ICD-10-PCS | Mod: S$PBB,,, | Performed by: OTOLARYNGOLOGY

## 2022-10-17 PROCEDURE — 99999 PR PBB SHADOW E&M-EST. PATIENT-LVL III: CPT | Mod: PBBFAC,,, | Performed by: OTOLARYNGOLOGY

## 2022-10-17 PROCEDURE — 99214 OFFICE O/P EST MOD 30 MIN: CPT | Mod: S$PBB,,, | Performed by: OTOLARYNGOLOGY

## 2022-10-17 PROCEDURE — 99999 PR PBB SHADOW E&M-EST. PATIENT-LVL III: ICD-10-PCS | Mod: PBBFAC,,, | Performed by: OTOLARYNGOLOGY

## 2022-10-17 NOTE — PATIENT INSTRUCTIONS
Continue with saline rinses and nasal steroids for we hope will be continued improvement in sinus and middle ear inflammation.  If anything gets worse or fails to continue to improve then add an Afrin decongestant nasal spray regimen as outlined with continued rinses and nasal steroids to follow concurrent with a 6 day course of methylprednisolone (Medrol Dosepak ) as prescribed today.      Follow-up with allergy to consider immunotherapy as discussed.      Return if symptoms do not resolve entirely including ongoing right ear fullness which appears to be related to some middle ear dysfunction though not objectively found to be the case on audiologic testing.      AFRIN/OXYMETAZOLINE NASAL DECONGESTANT REGIMEN    Afrin decongestant nasal spray 2 sprays towards the ear each side twice daily for 3 days, stop for 1 day, return for 3 more days then discontinue use entirely.      INTRANASAL STEROID SPRAYS      Intranasal steroid sprays are available both by prescription and over-the-counter both in generic and brand name preparations.  They are all very similar in efficacy and side effect profiles.  Over-the-counter and prescription intranasal steroids include fluticasone propionate (Flonase), fluticasone furoate (Sensimist), triamcinolone (Nasacort), and budesonide (Rhinocort).  While these medications are available as prescriptions as well there are few nasal steroids in her available by prescription only and include mometasone (Nasonex), flunisolide (Nasarel), and beclomethasone (QNASL).    Nasal steroids or the foundation of treatment of both allergy and other inflammatory conditions of the nose and sinuses.  They are safe for regular use and while there are many side effects listed most of these are steroid class effects and not typically encountered.  Typical side effects include dryness and even ulceration and bleeding of the nose.  These side effects can be minimized by proper application and proper  moisturization with saline and saline gel.    Sometimes changing between 1 brand of nasal steroid and another can result in improved control of symptoms especially after long term use of one particular nasal steroid.    Proper application of the nasal steroid spray is accomplished by spraying towards the I/ear on the same side with the tip of the superior just barely inside the nostril with the chin slightly downward.  Any dripping should be gently inhaled not sniff test backwards into the throat.  Labeled instructions should be followed.        NASAL SALINE    Still saline comes in many preparations including sprays/mists, gels, and rinses.  Different preparations served different purposes.  Saline spray helps to briefly moisturize the nose and help clear mucus.  Saline gels coat the nose for longer protective benefit of keeping the linings the nose moist.  Saline rinses clear the nose and sinuses and a more thorough way in her best used for significant postnasal drip and sinus complaints.  A combination of saline sprays/mists, gels and rinses should be used to address routine nasal clearing and dryness issues as well as flushing for better control of allergy and postnasal drip symptoms.  There is no real risk of over use of nasal saline products.  Saline sprays do not have any of the potential rebound or addiction of nasal decongestant sprays.  Nasal saline sprays and rinses should be used prior to the application of any medicated nasal sprays such as nasal steroids or nasal antihistamine sprays.        ASTELIN (Azelastine) nasal spray    Astelin is a topical nasal antihistamine which can be of additional benefit in controlling nasal allergy and postnasal drip.  Typically is recommended on an as-needed basis 1-2 sprays each nostril twice daily.  People often find it beneficial at night.  This typically added to her regimen of saline and nasal steroids as a 3rd line agent for as needed use.  Excessive use can  cause excessive dryness and even nose bleeds.  It has a very strong taste which many people find intolerable.  Astelin needs to be stopped 5-7 days prior to any skin allergy testing just like oral antihistamines as it will inhibit the skin response.

## 2022-10-17 NOTE — PROGRESS NOTES
[facility]    Subjective:      Patient: Mi Hancock Patient PCP: Scar Mendez Iv, MD         :  1955     Sex:  female      MRN:  [id number]          Date of Visit: 10/17/2022      Chief Complaint: Follow-up (Follow up on ears (brought outside audio report-will scan into chart) and sinuses. States sinuses are somewhat improved. States rinses and sprays are helping. )       10/17/2022 follow-up visit: Mi Hancock is a 67 y.o. female former 40 pack year smoker quit in  with allergy issues her whole life poorly controlled with INS and Allegra referred to me by Dr. Rupa Stoddard in consultation for chronic sinusitis and ear fullness.  Treated with saline, change of nasal steroid, and Astelin and discussed proceeding with immunotherapy with Dr. Villafana if appropriate.  Audiogram for ear fullness with no visible effusion reviewed today which shows borderline hearing bilaterally slightly worse on the right than the left which is not clearly of middle ear origin.  Tympanograms appear A/As bilaterally with normal volumes.  Some improvement in nasal congestion and sinus symptoms.  No loss of smell no pain no purulence no blood.  No unilateral nasal obstruction.          08/15/2022 initial patient consultation: Mi Hancock is a 67 y.o. female former 40 pack year smoker quit in  with allergy issues her whole life poorly controlled with INS and Allegra referred to me by Dr. Rupa Stoddard in consultation for chronic sinusitis.  CT 22 images reviewed with right septal deviation/spurring and right greater than left maxillary sinusitis with acute on chronic inflammatory changes including partial opacification of most of the ethmoids into a limited degree of the anterior aspects of the sphenoid sinuses with some retained debris in the left sphenoid sinus.  Small frontal sinuses which other than the recesses appear to be uninvolved with any chronic mucoperiosteal thickening.  No middle ear or  mastoid effusion appreciated.  Treated with Augmentin after CT findings consistent with acute on chronic sinusitis with right maxillary fluid level.  ImmunoCAP CBC and IgE ordered with normal IgE levels and findings of rag weed, marsh elder, oak, English plantain class 0/1 IgE and, Mehul and Bermuda grass class to IgE elevations.    Patient reports lifelong nasal congestion/obstruction.  She had some improvement in a feeling of fluid and pressure/fullness of the right maxillary sinus with the Augmentin but continues to feel the inability to clear the right ear as well as generalized nasal congestion and bothersome postnasal drip with associated hoarseness.  She has used Flonase for years without resolution has never tried a different nasal steroid spray.  She has used air supply saline before but never a routine sinus rinsing regimen.  No history of intranasal antihistamine use, steroids or montelukast.  Complains of snoring without gross apnea symptoms.  No nasal pain or bleeding.  No ocular symptoms.          EXAMINATION:  CT SINUSES WITHOUT CONTRAST     CLINICAL HISTORY:  Sinonasal polyposis;  Chronic rhinitis     TECHNIQUE:  Axial low-dose images, coronal and sagittal reformations were performed through the paranasal sinuses.  Contrast was not administered.     COMPARISON:  None.     FINDINGS:  There is near complete opacification of the right maxillary sinus.  Mild circumferential mucoperiosteal thickening of the left maxillary sinus measuring up to 2.1 mm.     Mild dependent mucoperiosteal thickening of the frontal sinus measuring up to a maximum thickness of 2.9 mm.  Mild dependent mucoperiosteal thickening of the sphenoid sinus measuring to a maximum thickness of 2.6 mm.     Prominent mucoperiosteal thickening throughout the ethmoid air cells measuring up to 2.8 mm.     Mild rightward deviation of the nasal septum with a bony spur at the apex.  The right and left ostiomeatal complexes are obscured by  retained secretions.  Small right lamellar cell.  There is mild to moderate turbinate hypertrophy.     Visualized mastoid air cells and middle ears are normally pneumatized.     Impression:     1. Chronic pansinusitis most predominant within the right maxillary sinus.  2. Nasal septal deviation.  3. Turbinate hypertrophy.        Electronically signed by: Michael Singleton    Review of Systems   Constitutional: Negative.    HENT:  Positive for hearing loss, postnasal drip and sinus pressure.    Eyes:  Positive for itching.   Respiratory:  Positive for cough and shortness of breath.    Cardiovascular: Negative.    Gastrointestinal: Negative.    Endocrine: Negative.    Genitourinary: Negative.    Musculoskeletal:  Positive for neck pain.   Skin: Negative.    Neurological:  Positive for dizziness, light-headedness and headaches.   Hematological: Negative.    Psychiatric/Behavioral: Negative.        Past Medical History  She has a past medical history of Allergy, Angio-edema, Melanoma, and Thyroid nodule.    Family / Surgical / Social History  Her family history includes Allergies in her brother; Breast cancer in her maternal aunt; Heart disease in her father; Osteoarthritis in her mother.    Past Surgical History:   Procedure Laterality Date    APPENDECTOMY      HYSTERECTOMY      melanoma Left     flank       Social History     Tobacco Use    Smoking status: Former     Packs/day: 2.00     Years: 10.00     Pack years: 20.00     Types: Cigarettes     Quit date:      Years since quittin.8    Smokeless tobacco: Never   Substance and Sexual Activity    Alcohol use: Yes     Alcohol/week: 2.0 standard drinks     Types: 2 Glasses of wine per week    Drug use: No    Sexual activity: Not Currently     Partners: Male       Medications  She has a current medication list which includes the following prescription(s): estradiol, fexofenadine, sodium chloride 5%, triamcinolone, azelastine, and  hydrocortisone.      Allergies  Review of patient's allergies indicates:  No Known Allergies    All medications, allergies, and past history have been reviewed.    Objective:      Vitals:  Vitals - 1 value per visit 8/15/2022 10/17/2022 10/17/2022   SYSTOLIC - - -   DIASTOLIC - - -   Pulse - - -   Temp 98.1 - 98   Weight (lb) 204.37 - 205.69   Weight (kg) 92.7 - 93.3   Height 67 - 67   BMI (Calculated) 32 - 32.2   VISIT REPORT - - -   Pain Score  - 0 -       Body surface area is 2.1 meters squared.    Physical Exam:    GENERAL  APPEARANCE -  alert, appears stated age, cooperative and mildly obese  BARRIER(S) TO COMMUNICATION -  none VOICE - hyponasality (sounds like a mild cold), no longer coarse    INTEGUMENTARY  no suspicious head and neck lesions    HEENT  HEAD: Normocephalic, without obvious abnormality, atraumatic  FACE: INSPECTION - Symmetric, no signs of trauma, no suspicious lesion(s)  PALPATION -  No masses SALIVARY GLANDS - non-tender with no appreciable mass  STRENGTH - facial symmetry  NECK/THYROID: normal atraumatic, no neck masses, normal thyroid, no jvd    EYES  Normal occular alignment and mobility with no visible nystagmus at rest    EARS/NOSE/MOUTH/THROAT  EARS  PINNAE AND EXTERNAL EARS - no suspicious lesion OTOSCOPIC EXAM (surgical microscopy was used for visualization/instrumentation): EAR EXAM - dull with mild retraction right only  HEARING - adequate at conversation    NOSE AND SINUSES  EXTERNAL NOSE - Grossly normal for age/sex  SEPTUM - deviation and spurring right > 75% TURBINATES - left > right 2-3+ hypertrophy MUCOSA - pink/pale, mild edema, no anterior polyps or pus     MOUTH AND THROAT   ORAL CAVITY, LIPS, TEETH, GUMS & TONGUE - moist, no suspicious lesions  OROPHARYNX /TONSILS/PHARYNGEAL WALLS/HYPOPHARYNX - no erythema or exudates  NASOPHARYNX - limited mirror exam - unable to visualize due to anatomy/gag  LARYNX -  - limited mirror exam - unable to visualize due to anatomy/gag       CHEST AND LUNG   INSPECTION & AUSCULTATION - normal effort, no stridor    CARDIOVASCULAR  AUSCULTATION & PERIPHERAL VASCULAR - regular rate and rhythm.    NEUROLOGIC  MENTAL STATUS - alert, interactive CRANIAL NERVES - normal    LYMPHATIC  HEAD AND NECK - non-palpable; SUPRACLAVICULAR - deferred; AXILLARY - deferred; INGUINAL - deferred; LIVER/SPLEEN - deferred        Procedure(s):  None    Labs:  WBC   Date Value Ref Range Status   07/12/2022 8.33 3.90 - 12.70 K/uL Final     Hemoglobin   Date Value Ref Range Status   07/12/2022 12.7 12.0 - 16.0 g/dL Final     Platelets   Date Value Ref Range Status   07/12/2022 302 150 - 450 K/uL Final         Assessment:      Problem List Items Addressed This Visit          ENT    Seasonal allergic rhinitis due to pollen    Nasal turbinate hypertrophy    Deviated septum    Chronic pansinusitis    Hearing loss    Sensation of fullness in right ear    Chronic maxillary sinusitis - Primary    Episodic recurrent vertigo            Plan:        While surgical intervention including septoplasty, turbinate reductions, and pan sinus surgery (frontal balloon dilation only) is indicated/appropriate, we will continue our current course of medical therapy before electing to proceed with surgical treatment as an interval step in the management of chronic sinusitis as discussed.    Continue with saline rinses and nasal steroids for we hope will be continued improvement in sinus and middle ear inflammation.  If anything gets worse or fails to continue to improve then add an Afrin decongestant nasal spray regimen as outlined with continued rinses and nasal steroids to follow concurrent with a 6 day course of methylprednisolone (Medrol Dosepak ) as prescribed today.      Follow-up with allergy to consider immunotherapy as discussed.      Return if symptoms do not resolve entirely including ongoing right ear fullness which appears to be related to some middle ear dysfunction though not  objectively found to be the case on audiologic testing.

## 2022-10-24 ENCOUNTER — PATIENT MESSAGE (OUTPATIENT)
Dept: OTOLARYNGOLOGY | Facility: CLINIC | Age: 67
End: 2022-10-24
Payer: MEDICARE

## 2022-10-24 RX ORDER — METHYLPREDNISOLONE 4 MG/1
TABLET ORAL
Qty: 21 EACH | Refills: 0 | Status: SHIPPED | OUTPATIENT
Start: 2022-10-24 | End: 2023-06-01

## 2023-01-11 ENCOUNTER — OFFICE VISIT (OUTPATIENT)
Dept: CARDIOLOGY | Facility: CLINIC | Age: 68
End: 2023-01-11
Payer: MEDICARE

## 2023-01-11 ENCOUNTER — TELEPHONE (OUTPATIENT)
Dept: CARDIOLOGY | Facility: CLINIC | Age: 68
End: 2023-01-11
Payer: MEDICARE

## 2023-01-11 VITALS
DIASTOLIC BLOOD PRESSURE: 102 MMHG | SYSTOLIC BLOOD PRESSURE: 148 MMHG | HEIGHT: 67 IN | RESPIRATION RATE: 16 BRPM | BODY MASS INDEX: 31.86 KG/M2 | OXYGEN SATURATION: 98 % | WEIGHT: 203 LBS | HEART RATE: 94 BPM

## 2023-01-11 DIAGNOSIS — I10 PRIMARY HYPERTENSION: Primary | ICD-10-CM

## 2023-01-11 DIAGNOSIS — H91.90 HEARING LOSS, UNSPECIFIED HEARING LOSS TYPE, UNSPECIFIED LATERALITY: ICD-10-CM

## 2023-01-11 DIAGNOSIS — J32.4 CHRONIC PANSINUSITIS: ICD-10-CM

## 2023-01-11 DIAGNOSIS — R07.2 PRECORDIAL PAIN: ICD-10-CM

## 2023-01-11 PROCEDURE — 99204 OFFICE O/P NEW MOD 45 MIN: CPT | Mod: S$GLB,,, | Performed by: INTERNAL MEDICINE

## 2023-01-11 PROCEDURE — 99204 PR OFFICE/OUTPT VISIT, NEW, LEVL IV, 45-59 MIN: ICD-10-PCS | Mod: S$GLB,,, | Performed by: INTERNAL MEDICINE

## 2023-01-11 PROCEDURE — 93000 EKG 12-LEAD: ICD-10-PCS | Mod: ,,, | Performed by: INTERNAL MEDICINE

## 2023-01-11 PROCEDURE — 93000 ELECTROCARDIOGRAM COMPLETE: CPT | Mod: ,,, | Performed by: INTERNAL MEDICINE

## 2023-01-11 RX ORDER — AMLODIPINE BESYLATE 5 MG/1
5 TABLET ORAL DAILY
Qty: 30 TABLET | Refills: 11 | Status: SHIPPED | OUTPATIENT
Start: 2023-01-11 | End: 2023-10-12

## 2023-01-11 RX ORDER — NITROGLYCERIN 40 MG/1
1 PATCH TRANSDERMAL DAILY
Qty: 30 PATCH | Refills: 11 | Status: SHIPPED | OUTPATIENT
Start: 2023-01-11 | End: 2023-06-01

## 2023-01-11 NOTE — ASSESSMENT & PLAN NOTE
In addition to controlling her blood pressure recommend to start on topical nitrates Nitro-Dur patch 0.2 mg an hour to put on 1st thing in the morning and taken off at nighttime.  Use Tylenol p.r.n. basis for headaches in the meanwhile pursue cardiac workup.  Was scheduled for  Exercise stress test with myocardial perfusion imaging study   2. Obtain echocardiogram for LV function assessment wall thickness and chamber sizes  3. Obtain laboratory profile to include chemistry TSH as well as lipid profile for risk stratification  4. Should she have any prolonged episodes of chest discomfort unrelieved then I would encourage her to seek immediate evaluation in a facility so any invasive workup can be initiated.  At this point she does not have any ischemic changes on electrocardiogram and she has no further episodes of pain since last week.  So will continue this in the meanwhile also start on H2 blockers Pepcid 20 mg daily

## 2023-01-11 NOTE — TELEPHONE ENCOUNTER
----- Message from Judah Martinez sent at 1/11/2023  2:29 PM CST -----  Type:  Patient Returning Call    Who Called:  pt  Who Left Message for Patient:  unknown  Does the patient know what this is regarding?:  yes  Best Call Back Number:  [phone] (home) [phone] (work)    Additional Information:  please call and advise

## 2023-01-11 NOTE — ASSESSMENT & PLAN NOTE
She has new onset primary hypertension.  Recommend to start on amlodipine 5 mg daily maintain low-salt diet encouraged her to monitor blood pressures 4 times a week after seating 5 minutes comfortably.  Will also add topical nitrates because of her chest discomfort and pursue cardiac workup.

## 2023-01-11 NOTE — PROGRESS NOTES
Subjective:    Patient ID:  Mi Hancock is a 67 y.o. female patient here for evaluation Establish Care, Chest Pain (Last Tuesday she had a scare of increased pain), Shortness of Breath, and Hypertension      History of Present Illness:   Patient is a 67-year-old lady who has developed arterial hypertension in the last couple of months arm noted to have intermittent episodes of chest discomfort last episode was on Tuesday a week ago arm had discomfort in left pectoral area and some radiation to the left side of the jaw noted.  And was associated with some shortness of breath lasted for about transient about 45 seconds to a minute and resolved spontaneously.  Arm she is occasional transient twinges noted but nonsustained since the last episode.    Arm she had some upper respiratory infection at taken some steroids in October.  For persistent nasal congestion.    Arm no arm pain shoulder pain occasional back pain arm below the shoulder blades are noted.    No significant dyspeptic symptoms are noted occasional transient nausea without any vomiting.  No blood in the stools or black stools   No cough no fevers or chills and no COVID infection    Social history  Former smoker quit in    Family history  Both parents have heart disease, father  at age 54 with MI.    Mother 87 years of age and still living are history of arterial hypertension    Review of patient's allergies indicates:  No Known Allergies    Past Medical History:   Diagnosis Date    Allergy     Angio-edema     Melanoma     Thyroid nodule      Past Surgical History:   Procedure Laterality Date    APPENDECTOMY      HYSTERECTOMY      melanoma Left     flank     Social History     Tobacco Use    Smoking status: Former     Packs/day: 2.00     Years: 10.00     Pack years: 20.00     Types: Cigarettes     Quit date:      Years since quittin.0    Smokeless tobacco: Never   Substance Use Topics    Alcohol use: Yes     Alcohol/week: 2.0 standard drinks      Types: 2 Glasses of wine per week    Drug use: No        Review of Systems   As noted in HPI in addition     Constitutional: Negative for chills, fatigue and fever.   Eyes: No double vision, No blurred vision  Neuro: + headaches, No dizziness  Respiratory: Negative for cough, +shortness of breath denies having any wheezing.  Cardiovascular:  Recurrent transient chest pains as described above. Negative for palpitations and leg swelling.   Gastrointestinal: Negative for abdominal pain, No melena, diarrhea, occasional nausea without vomiting   Genitourinary: Negative for dysuria and frequency, Negative for hematuria  Skin: Negative for bruising, Negative for edema or discoloration noted.   Endocrine: Negative for polyphagia, Negative for heat intolerance, Negative for cold intolerance  Psychiatric: Negative for depression, Negative for anxiety, Negative for memory loss  Musculoskeletal: Negative for neck pain, Negative for muscle weakness, Negative for back pain   Occasional headaches are noted.       Objective        Vitals:    01/11/23 1042   BP: (!) 148/102   Pulse: 94   Resp: 16       LIPIDS - LAST 2   No results found for: CHOL, HDL, LDLCALC, TRIG, CHOLHDL    CBC - LAST 2  Lab Results   Component Value Date    WBC 8.33 07/12/2022    RBC 4.04 07/12/2022    HGB 12.7 07/12/2022    HCT 39.6 07/12/2022    MCV 98 07/12/2022    MCH 31.4 (H) 07/12/2022    MCHC 32.1 07/12/2022    RDW 13.0 07/12/2022     07/12/2022    MPV 10.5 07/12/2022    GRAN 4.3 07/12/2022    GRAN 51.9 07/12/2022    LYMPH 3.0 07/12/2022    LYMPH 36.1 07/12/2022    MONO 0.7 07/12/2022    MONO 7.9 07/12/2022    BASO 0.07 07/12/2022    NRBC 0 07/12/2022       CHEMISTRY & LIVER FUNCTION - LAST 2  No results found for: NA, K, CL, CO2, ANIONGAP, BUN, CREATININE, GLU, CALCIUM, PH, MG, ALBUMIN, PROT, ALKPHOS, ALT, AST, BILITOT     CARDIAC PROFILE - LAST 2  No results found for: BNP, CPK, CPKMB, LDH, TROPONINI, TROPONINIHS     COAGULATION - LAST 2  No  results found for: LABPT, INR, APTT    ENDOCRINE & PSA - LAST 2  No results found for: HGBA1C, MICROALBUR, TSH, PROCAL, PSA     ECHOCARDIOGRAM RESULTS  No results found for this or any previous visit.      CURRENT/PREVIOUS VISIT EKG  No results found for this or any previous visit.  No valid procedures specified.   No results found for this or any previous visit.    No valid procedures specified.          PREVIOUS STRESS TEST              PREVIOUS ANGIOGRAM        PHYSICAL EXAM    GENERAL: well built, well nourished, well-developed in no apparent distress alert and oriented.   HEENT: Normocephalic. Pupils normal and conjunctivae normal.  Mucous membranes normal, no cyanosis or icterus, trachea central,no pallor or icterus is noted..   NECK: No JVD. No bruit..   THYROID: Thyroid not enlarged. No nodules present..   CARDIAC: Regular rate and rhythm. S1 is normal.S2 is normal.No gallops, clicks or murmurs noted at this time.  CHEST ANATOMY: normal.   LUNGS: Clear to auscultation. No wheezing or rhonchi..   ABDOMEN: Soft no masses or organomegaly.  No abdomen pulsations or bruits.  Normal bowel sounds. No pulsations and no masses felt, No guarding or rebound.   EXTREMITIES: No cyanosis, clubbing or edema noted at this time., no calf tenderness bilaterally.   PERIPHERAL VASCULAR SYSTEM: Good palpable distal pulses.   CENTRAL NERVOUS SYSTEM: No focal motor or sensory deficits noted.   SKIN: Skin without lesions, moist, well perfused.   MUSCLE STRENGTH & TONE: No noteable weakness, atrophy or abnormal movement.     I HAVE REVIEWED :    The vital signs, nurses notes, and all the pertinent radiology and labs.        Current Outpatient Medications   Medication Instructions    azelastine (ASTELIN) 274 mcg, Nasal, 2 times daily PRN    estradioL (ESTRACE) 0.5 g, Vaginal, Twice weekly    fexofenadine (ALLEGRA) 60 mg, Oral, Daily    hydrocortisone 2.5 % cream Apply  topically 2 (two) times daily.    methylPREDNISolone (MEDROL  DOSEPACK) 4 mg tablet use as directed    sodium chloride 5% (ROBSON 128) 5 % ophthalmic solution 1 drop, As needed (PRN)    triamcinolone (NASACORT) 55 mcg nasal inhaler 2 sprays, Nasal, Daily      01/11/2023 EKG shows sinus rhythm rate of 79 beats per minute left axis deviation poor R-wave progression otherwise no acute ST changes noted.    Assessment & Plan     Primary hypertension  She has new onset primary hypertension.  Recommend to start on amlodipine 5 mg daily maintain low-salt diet encouraged her to monitor blood pressures 4 times a week after seating 5 minutes comfortably.  Will also add topical nitrates because of her chest discomfort and pursue cardiac workup.    Precordial pain  In addition to controlling her blood pressure recommend to start on topical nitrates Nitro-Dur patch 0.2 mg an hour to put on 1st thing in the morning and taken off at nighttime.  Use Tylenol p.r.n. basis for headaches in the meanwhile pursue cardiac workup.  Was scheduled for  Exercise stress test with myocardial perfusion imaging study   2. Obtain echocardiogram for LV function assessment wall thickness and chamber sizes  3. Obtain laboratory profile to include chemistry TSH as well as lipid profile for risk stratification  4. Should she have any prolonged episodes of chest discomfort unrelieved then I would encourage her to seek immediate evaluation in a facility so any invasive workup can be initiated.  At this point she does not have any ischemic changes on electrocardiogram and she has no further episodes of pain since last week.  So will continue this in the meanwhile also start on H2 blockers Pepcid 20 mg daily    Hearing loss  She is some progressive hearing loss.    Chronic pansinusitis  Chronic pansinusitis reasonably controlled continue on Allegra 60 mg an Astelin are Flonase as needed      Further recommendations based on findings of these tests  In the meanwhile if she has any prolonged episodes of pain encouraged to  seek evaluation in the emergency room or immediate medical help    No follow-ups on file.

## 2023-01-11 NOTE — ASSESSMENT & PLAN NOTE
Chronic pansinusitis reasonably controlled continue on Allegra 60 mg an Astelin are Flonase as needed

## 2023-01-19 ENCOUNTER — LAB VISIT (OUTPATIENT)
Dept: LAB | Facility: HOSPITAL | Age: 68
End: 2023-01-19
Attending: INTERNAL MEDICINE
Payer: MEDICARE

## 2023-01-19 DIAGNOSIS — R07.2 PRECORDIAL PAIN: ICD-10-CM

## 2023-01-19 DIAGNOSIS — I10 PRIMARY HYPERTENSION: ICD-10-CM

## 2023-01-19 LAB
ALBUMIN SERPL BCP-MCNC: 3.7 G/DL (ref 3.5–5.2)
ALP SERPL-CCNC: 79 U/L (ref 55–135)
ALT SERPL W/O P-5'-P-CCNC: 16 U/L (ref 10–44)
ANION GAP SERPL CALC-SCNC: 9 MMOL/L (ref 8–16)
AST SERPL-CCNC: 14 U/L (ref 10–40)
BILIRUB SERPL-MCNC: 0.5 MG/DL (ref 0.1–1)
BUN SERPL-MCNC: 14 MG/DL (ref 8–23)
CALCIUM SERPL-MCNC: 9.3 MG/DL (ref 8.7–10.5)
CHLORIDE SERPL-SCNC: 106 MMOL/L (ref 95–110)
CHOLEST SERPL-MCNC: 271 MG/DL (ref 120–199)
CHOLEST/HDLC SERPL: 4.7 {RATIO} (ref 2–5)
CO2 SERPL-SCNC: 25 MMOL/L (ref 23–29)
CREAT SERPL-MCNC: 0.7 MG/DL (ref 0.5–1.4)
ERYTHROCYTE [DISTWIDTH] IN BLOOD BY AUTOMATED COUNT: 12.1 % (ref 11.5–14.5)
EST. GFR  (NO RACE VARIABLE): >60 ML/MIN/1.73 M^2
GLUCOSE SERPL-MCNC: 96 MG/DL (ref 70–110)
HCT VFR BLD AUTO: 39.6 % (ref 37–48.5)
HDLC SERPL-MCNC: 58 MG/DL (ref 40–75)
HDLC SERPL: 21.4 % (ref 20–50)
HGB BLD-MCNC: 13.4 G/DL (ref 12–16)
LDLC SERPL CALC-MCNC: 180.8 MG/DL (ref 63–159)
MAGNESIUM SERPL-MCNC: 2 MG/DL (ref 1.6–2.6)
MCH RBC QN AUTO: 31.6 PG (ref 27–31)
MCHC RBC AUTO-ENTMCNC: 33.8 G/DL (ref 32–36)
MCV RBC AUTO: 93 FL (ref 82–98)
NONHDLC SERPL-MCNC: 213 MG/DL
PLATELET # BLD AUTO: 311 K/UL (ref 150–450)
PMV BLD AUTO: 9.9 FL (ref 9.2–12.9)
POTASSIUM SERPL-SCNC: 4.6 MMOL/L (ref 3.5–5.1)
PROT SERPL-MCNC: 7.4 G/DL (ref 6–8.4)
RBC # BLD AUTO: 4.24 M/UL (ref 4–5.4)
SODIUM SERPL-SCNC: 140 MMOL/L (ref 136–145)
T4 FREE SERPL-MCNC: 1.08 NG/DL (ref 0.71–1.51)
TRIGL SERPL-MCNC: 161 MG/DL (ref 30–150)
TSH SERPL DL<=0.005 MIU/L-ACNC: 1.36 UIU/ML (ref 0.4–4)
WBC # BLD AUTO: 6.55 K/UL (ref 3.9–12.7)

## 2023-01-19 PROCEDURE — 84439 ASSAY OF FREE THYROXINE: CPT | Performed by: INTERNAL MEDICINE

## 2023-01-19 PROCEDURE — 85027 COMPLETE CBC AUTOMATED: CPT | Performed by: INTERNAL MEDICINE

## 2023-01-19 PROCEDURE — 83735 ASSAY OF MAGNESIUM: CPT | Performed by: INTERNAL MEDICINE

## 2023-01-19 PROCEDURE — 80061 LIPID PANEL: CPT | Performed by: INTERNAL MEDICINE

## 2023-01-19 PROCEDURE — 80053 COMPREHEN METABOLIC PANEL: CPT | Performed by: INTERNAL MEDICINE

## 2023-01-19 PROCEDURE — 84443 ASSAY THYROID STIM HORMONE: CPT | Performed by: INTERNAL MEDICINE

## 2023-01-19 PROCEDURE — 36415 COLL VENOUS BLD VENIPUNCTURE: CPT | Performed by: INTERNAL MEDICINE

## 2023-01-20 RX ORDER — ROSUVASTATIN CALCIUM 20 MG/1
20 TABLET, COATED ORAL DAILY
Qty: 90 TABLET | Refills: 3 | Status: SHIPPED | OUTPATIENT
Start: 2023-01-20 | End: 2023-10-18

## 2023-01-24 ENCOUNTER — TELEPHONE (OUTPATIENT)
Dept: CARDIOLOGY | Facility: HOSPITAL | Age: 68
End: 2023-01-24

## 2023-01-24 NOTE — TELEPHONE ENCOUNTER
Patient advised, test [facility]   Will need to register on the first floor at the main entrance.   Patient advised that arrival time is 6:40am.  Patient advised that she may be here about 3.5-4 hours, and may want to bring something to occupy their time, as there will be periods of waiting.    Patient advised, may take her medications prior to testing if you need to.  Patient should HOLD Nitroglycerin. Advised if she needs to eat to take her medications, please keep it light, like toast and juice.    Patient advised to avoid all caffeine 12 hours prior to testing.  This includes decaf tea and coffee.    Will provide peanut butter crackers for a snack after stress test.  If patient would prefer something else, please bring a snack from home.    Wear comfortable clothing.   No lotions, oils, or powders to the upper chest area. May wear deodorant.    No metal jewelry, buttons, or zippers to the upper body.  Patient verbalizes understanding of instructions.

## 2023-01-25 ENCOUNTER — HOSPITAL ENCOUNTER (OUTPATIENT)
Dept: RADIOLOGY | Facility: HOSPITAL | Age: 68
Discharge: HOME | End: 2023-01-25
Attending: INTERNAL MEDICINE
Payer: MEDICARE

## 2023-01-25 ENCOUNTER — HOSPITAL ENCOUNTER (OUTPATIENT)
Dept: CARDIOLOGY | Facility: HOSPITAL | Age: 68
Discharge: HOME | End: 2023-01-25
Attending: INTERNAL MEDICINE
Payer: MEDICARE

## 2023-01-25 VITALS — BODY MASS INDEX: 31.86 KG/M2 | WEIGHT: 203 LBS | HEIGHT: 67 IN

## 2023-01-25 DIAGNOSIS — I10 PRIMARY HYPERTENSION: ICD-10-CM

## 2023-01-25 DIAGNOSIS — R07.2 PRECORDIAL PAIN: ICD-10-CM

## 2023-01-25 DIAGNOSIS — I10 PRIMARY HYPERTENSION: Primary | ICD-10-CM

## 2023-01-25 LAB
CV STRESS BASE HR: 78 BPM
DIASTOLIC BLOOD PRESSURE: 78 MMHG
EJECTION FRACTION- HIGH: 65 %
END DIASTOLIC INDEX-HIGH: 153 ML/M2
END DIASTOLIC INDEX-LOW: 93 ML/M2
END SYSTOLIC INDEX-HIGH: 71 ML/M2
END SYSTOLIC INDEX-LOW: 31 ML/M2
NUC REST DIASTOLIC VOLUME INDEX: 63
NUC REST EJECTION FRACTION: 73
NUC REST SYSTOLIC VOLUME INDEX: 17
NUC STRESS DIASTOLIC VOLUME INDEX: 45
NUC STRESS EJECTION FRACTION: 76 %
NUC STRESS SYSTOLIC VOLUME INDEX: 11
OHS CV CPX 1 MINUTE RECOVERY HEART RATE: 139 BPM
OHS CV CPX 85 PERCENT MAX PREDICTED HEART RATE MALE: 123
OHS CV CPX ESTIMATED METS: 5
OHS CV CPX MAX PREDICTED HEART RATE: 145
OHS CV CPX PATIENT IS FEMALE: 1
OHS CV CPX PATIENT IS MALE: 0
OHS CV CPX PEAK DIASTOLIC BLOOD PRESSURE: 82 MMHG
OHS CV CPX PEAK HEAR RATE: 144 BPM
OHS CV CPX PEAK RATE PRESSURE PRODUCT: NORMAL
OHS CV CPX PEAK SYSTOLIC BLOOD PRESSURE: 158 MMHG
OHS CV CPX PERCENT MAX PREDICTED HEART RATE ACHIEVED: 99
OHS CV CPX RATE PRESSURE PRODUCT PRESENTING: NORMAL
RETIRED EF AND QEF - SEE NOTES: 53 %
STRESS ECHO POST EXERCISE DUR MIN: 3 MINUTES
STRESS ECHO POST EXERCISE DUR SEC: 0 SECONDS
SYSTOLIC BLOOD PRESSURE: 132 MMHG

## 2023-01-25 PROCEDURE — 93306 ECHO (CUPID ONLY): ICD-10-PCS | Mod: 26,,, | Performed by: INTERNAL MEDICINE

## 2023-01-25 PROCEDURE — 93018 NUCLEAR STRESS - CARDIOLOGY INTERPRETED (CUPID ONLY): ICD-10-PCS | Mod: ,,, | Performed by: INTERNAL MEDICINE

## 2023-01-25 PROCEDURE — 93306 TTE W/DOPPLER COMPLETE: CPT

## 2023-01-25 PROCEDURE — 78452 NUCLEAR STRESS - CARDIOLOGY INTERPRETED (CUPID ONLY): ICD-10-PCS | Mod: 26,,, | Performed by: INTERNAL MEDICINE

## 2023-01-25 PROCEDURE — A9502 TC99M TETROFOSMIN: HCPCS

## 2023-01-25 PROCEDURE — 78452 HT MUSCLE IMAGE SPECT MULT: CPT | Mod: 26,,, | Performed by: INTERNAL MEDICINE

## 2023-01-25 PROCEDURE — 93017 CV STRESS TEST TRACING ONLY: CPT

## 2023-01-25 PROCEDURE — 93016 NUCLEAR STRESS - CARDIOLOGY INTERPRETED (CUPID ONLY): ICD-10-PCS | Mod: ,,, | Performed by: NURSE PRACTITIONER

## 2023-01-25 PROCEDURE — 93016 CV STRESS TEST SUPVJ ONLY: CPT | Mod: ,,, | Performed by: NURSE PRACTITIONER

## 2023-01-25 PROCEDURE — 93018 CV STRESS TEST I&R ONLY: CPT | Mod: ,,, | Performed by: INTERNAL MEDICINE

## 2023-01-25 PROCEDURE — 93306 TTE W/DOPPLER COMPLETE: CPT | Mod: 26,,, | Performed by: INTERNAL MEDICINE

## 2023-02-04 LAB
AORTIC ROOT ANNULUS: 2.9 CM
AORTIC VALVE CUSP SEPERATION: 1.8 CM
AV INDEX (PROSTH): 0.92
AV MEAN GRADIENT: 4 MMHG
AV PEAK GRADIENT: 9 MMHG
AV REGURGITATION PRESSURE HALF TIME: 641 MS
AV VALVE AREA: 2.88 CM2
AV VELOCITY RATIO: 0.76
BSA FOR ECHO PROCEDURE: 2.09 M2
CV ECHO LV RWT: 0.5 CM
DOP CALC AO PEAK VEL: 1.48 M/S
DOP CALC AO VTI: 29 CM
DOP CALC LVOT AREA: 3.1 CM2
DOP CALC LVOT DIAMETER: 2 CM
DOP CALC LVOT PEAK VEL: 1.13 M/S
DOP CALC LVOT STROKE VOLUME: 83.52 CM3
DOP CALCLVOT PEAK VEL VTI: 26.6 CM
E WAVE DECELERATION TIME: 227 MSEC
E/A RATIO: 0.76
E/E' RATIO: 9.6 M/S
ECHO LV POSTERIOR WALL: 1 CM (ref 0.6–1.1)
EJECTION FRACTION: 68 %
FRACTIONAL SHORTENING: 45 % (ref 28–44)
INTERVENTRICULAR SEPTUM: 1.04 CM (ref 0.6–1.1)
IVRT: 90 MSEC
LA MAJOR: 3.4 CM
LEFT ATRIUM SIZE: 3.4 CM
LEFT INTERNAL DIMENSION IN SYSTOLE: 2.23 CM (ref 2.1–4)
LEFT VENTRICLE DIASTOLIC VOLUME INDEX: 34.88 ML/M2
LEFT VENTRICLE DIASTOLIC VOLUME: 70.8 ML
LEFT VENTRICLE MASS INDEX: 65 G/M2
LEFT VENTRICLE SYSTOLIC VOLUME INDEX: 11 ML/M2
LEFT VENTRICLE SYSTOLIC VOLUME: 22.3 ML
LEFT VENTRICULAR INTERNAL DIMENSION IN DIASTOLE: 4.02 CM (ref 3.5–6)
LEFT VENTRICULAR MASS: 131.71 G
LV LATERAL E/E' RATIO: 9 M/S
LV SEPTAL E/E' RATIO: 10.29 M/S
LVOT MG: 3 MMHG
LVOT MV: 0.77 CM/S
MV PEAK A VEL: 0.95 M/S
MV PEAK E VEL: 0.72 M/S
MV STENOSIS PRESSURE HALF TIME: 59 MS
MV VALVE AREA P 1/2 METHOD: 3.73 CM2
PISA AR MAX VEL: 1.91 M/S
PISA TR MAX VEL: 1.68 M/S
RA PRESSURE: 3 MMHG
RIGHT VENTRICULAR END-DIASTOLIC DIMENSION: 2.01 CM
TDI LATERAL: 0.08 M/S
TDI SEPTAL: 0.07 M/S
TDI: 0.08 M/S
TR MAX PG: 11 MMHG
TV REST PULMONARY ARTERY PRESSURE: 14 MMHG

## 2023-03-21 ENCOUNTER — TELEPHONE (OUTPATIENT)
Dept: CARDIOLOGY | Facility: CLINIC | Age: 68
End: 2023-03-21
Payer: MEDICARE

## 2023-03-21 NOTE — TELEPHONE ENCOUNTER
----- Message from Alexa Plascencia NP sent at 1/26/2023  9:02 AM CST -----  Your stress test is negative for reversible ischemia. The test is about 90% accurate. This means there are no blockages that are causing a problem, ( meaning over 70% blocked.) According to this test you are getting enough blood flow to the coronary arteries. If you are not having any symptoms we can push back your appointment, and save a copay. If you are having symptoms we will be happy to see you.

## 2023-06-01 ENCOUNTER — HOSPITAL ENCOUNTER (OUTPATIENT)
Dept: RADIOLOGY | Facility: HOSPITAL | Age: 68
Discharge: HOME | End: 2023-06-01
Attending: FAMILY MEDICINE
Payer: MEDICARE

## 2023-06-01 ENCOUNTER — OFFICE VISIT (OUTPATIENT)
Dept: FAMILY MEDICINE | Facility: CLINIC | Age: 68
End: 2023-06-01
Payer: MEDICARE

## 2023-06-01 VITALS
HEIGHT: 67 IN | DIASTOLIC BLOOD PRESSURE: 89 MMHG | HEART RATE: 74 BPM | BODY MASS INDEX: 32.85 KG/M2 | OXYGEN SATURATION: 99 % | SYSTOLIC BLOOD PRESSURE: 138 MMHG | WEIGHT: 209.31 LBS | RESPIRATION RATE: 16 BRPM

## 2023-06-01 DIAGNOSIS — Z12.31 SCREENING MAMMOGRAM FOR BREAST CANCER: ICD-10-CM

## 2023-06-01 DIAGNOSIS — Z85.820 HISTORY OF MELANOMA: ICD-10-CM

## 2023-06-01 DIAGNOSIS — Z12.11 ENCOUNTER FOR SCREENING COLONOSCOPY: ICD-10-CM

## 2023-06-01 DIAGNOSIS — M79.672 FOOT PAIN, LEFT: Primary | ICD-10-CM

## 2023-06-01 DIAGNOSIS — M79.672 FOOT PAIN, LEFT: ICD-10-CM

## 2023-06-01 PROCEDURE — 73630 X-RAY EXAM OF FOOT: CPT | Mod: 26,LT,, | Performed by: RADIOLOGY

## 2023-06-01 PROCEDURE — 73630 XR FOOT COMPLETE 3 VIEW LEFT: ICD-10-PCS | Mod: 26,LT,, | Performed by: RADIOLOGY

## 2023-06-01 PROCEDURE — 99999 PR PBB SHADOW E&M-EST. PATIENT-LVL V: ICD-10-PCS | Mod: PBBFAC,,, | Performed by: FAMILY MEDICINE

## 2023-06-01 PROCEDURE — 99215 OFFICE O/P EST HI 40 MIN: CPT | Mod: PBBFAC,PN | Performed by: FAMILY MEDICINE

## 2023-06-01 PROCEDURE — 99214 OFFICE O/P EST MOD 30 MIN: CPT | Mod: S$PBB,,, | Performed by: FAMILY MEDICINE

## 2023-06-01 PROCEDURE — 73630 X-RAY EXAM OF FOOT: CPT | Mod: TC,PN,LT

## 2023-06-01 PROCEDURE — 99214 PR OFFICE/OUTPT VISIT, EST, LEVL IV, 30-39 MIN: ICD-10-PCS | Mod: S$PBB,,, | Performed by: FAMILY MEDICINE

## 2023-06-01 PROCEDURE — 99999 PR PBB SHADOW E&M-EST. PATIENT-LVL V: CPT | Mod: PBBFAC,,, | Performed by: FAMILY MEDICINE

## 2023-06-01 NOTE — PROGRESS NOTES
Subjective:       Patient ID: Mi Hancock is a 67 y.o. female.    Chief Complaint: Establish Care (/) and Foot Pain (Left - feels bruised and toes have been swelling. States it feels very sore. Has not had any injuries. )    Presents today to establish care.     She was previously following with Dr. Mendez.     She has recently moved here.     Patient Active Problem List:     Seasonal allergic rhinitis due to pollen     Nasal turbinate hypertrophy     Deviated septum     Chronic pansinusitis     Hearing loss     Sensation of fullness in right ear     Chronic maxillary sinusitis     Episodic recurrent vertigo     Primary hypertension     Precordial pain    Current Outpatient Medications:  amLODIPine (NORVASC) 5 MG tablet, Take 1 tablet (5 mg total) by mouth once daily.  azelastine (ASTELIN) 137 mcg (0.1 %) nasal spray, 2 sprays (274 mcg total) by Nasal route 2 (two) times daily as needed for Rhinitis.  estradioL (ESTRACE) 0.01 % (0.1 mg/gram) vaginal cream, Place 0.5 g vaginally twice a week.  fexofenadine (ALLEGRA) 60 MG tablet, Take 60 mg by mouth once daily.  hydrocortisone 2.5 % cream, Apply  topically 2 (two) times daily.  rosuvastatin (CRESTOR) 20 MG tablet, Take 1 tablet (20 mg total) by mouth once daily.  sodium chloride 5% (ROBSON 128) 5 % ophthalmic solution, 1 drop as needed.  triamcinolone (NASACORT) 55 mcg nasal inhaler, 2 sprays by Nasal route once daily.    No current facility-administered medications for this visit.    She reports overall doing well.     She is having pain in the left lateral foot. Started about 3 weeks ago. She noticed some bruising and then some swelling across the top of her foot and pain over left side. She was walking around the house barefoot before this happens but denies any sort of trauma.     Review of Systems   Constitutional:  Negative for activity change, appetite change, fatigue and fever.   Respiratory:  Negative for shortness of breath.    Gastrointestinal:  Negative for  abdominal pain.   Musculoskeletal:  Positive for arthralgias.   Integumentary:  Negative for rash.       Objective:      Physical Exam  Vitals and nursing note reviewed.   Constitutional:       General: She is not in acute distress.     Appearance: She is not ill-appearing.   Cardiovascular:      Rate and Rhythm: Normal rate and regular rhythm.      Heart sounds: No murmur heard.  Pulmonary:      Effort: Pulmonary effort is normal.      Breath sounds: Normal breath sounds. No wheezing.   Skin:     General: Skin is warm and dry.      Findings: No rash.   Neurological:      Mental Status: She is alert.   Psychiatric:         Mood and Affect: Mood normal.         Behavior: Behavior normal.       Assessment:       1. Foot pain, left    2. Screening mammogram for breast cancer    3. Encounter for screening colonoscopy    4. History of melanoma        Plan:       Problem List Items Addressed This Visit    None  Visit Diagnoses       Foot pain, left    -  Primary    Relevant Orders    X-Ray Foot Complete Left    Screening mammogram for breast cancer        Relevant Orders    Mammo Digital Screening Bilat w/ De    Encounter for screening colonoscopy        Relevant Orders    Ambulatory referral/consult to Gastroenterology    History of melanoma        Relevant Orders    Ambulatory referral/consult to Dermatology

## 2023-06-02 ENCOUNTER — TELEPHONE (OUTPATIENT)
Dept: FAMILY MEDICINE | Facility: CLINIC | Age: 68
End: 2023-06-02
Payer: MEDICARE

## 2023-06-02 ENCOUNTER — PATIENT MESSAGE (OUTPATIENT)
Dept: FAMILY MEDICINE | Facility: CLINIC | Age: 68
End: 2023-06-02
Payer: MEDICARE

## 2023-06-02 DIAGNOSIS — M79.672 FOOT PAIN, LEFT: Primary | ICD-10-CM

## 2023-06-02 DIAGNOSIS — M84.375A STRESS FRACTURE OF LEFT FOOT, INITIAL ENCOUNTER: ICD-10-CM

## 2023-06-07 DIAGNOSIS — Z11.59 NEED FOR HEPATITIS C SCREENING TEST: ICD-10-CM

## 2023-06-09 ENCOUNTER — OFFICE VISIT (OUTPATIENT)
Dept: ORTHOPEDICS | Facility: CLINIC | Age: 68
End: 2023-06-09
Payer: MEDICARE

## 2023-06-09 VITALS — RESPIRATION RATE: 16 BRPM | BODY MASS INDEX: 32.83 KG/M2 | HEIGHT: 67 IN | WEIGHT: 209.19 LBS

## 2023-06-09 DIAGNOSIS — M84.375A STRESS FRACTURE OF LEFT FOOT, INITIAL ENCOUNTER: Primary | ICD-10-CM

## 2023-06-09 DIAGNOSIS — M79.672 FOOT PAIN, LEFT: ICD-10-CM

## 2023-06-09 PROCEDURE — 99999 PR PBB SHADOW E&M-EST. PATIENT-LVL III: CPT | Mod: PBBFAC,,, | Performed by: ORTHOPAEDIC SURGERY

## 2023-06-09 PROCEDURE — 99213 OFFICE O/P EST LOW 20 MIN: CPT | Mod: PBBFAC,PN | Performed by: ORTHOPAEDIC SURGERY

## 2023-06-09 PROCEDURE — 99999 PR PBB SHADOW E&M-EST. PATIENT-LVL III: ICD-10-PCS | Mod: PBBFAC,,, | Performed by: ORTHOPAEDIC SURGERY

## 2023-06-09 PROCEDURE — 99203 OFFICE O/P NEW LOW 30 MIN: CPT | Mod: S$PBB,,, | Performed by: ORTHOPAEDIC SURGERY

## 2023-06-09 PROCEDURE — 99203 PR OFFICE/OUTPT VISIT, NEW, LEVL III, 30-44 MIN: ICD-10-PCS | Mod: S$PBB,,, | Performed by: ORTHOPAEDIC SURGERY

## 2023-06-09 NOTE — PROGRESS NOTES
"  Subjective:      Patient ID: Mi Hancock is a 67 y.o. female.    Chief Complaint: Pain and Injury of the Left Foot    Referring Provider: Yesica Mcfarlane Md  [id number] [facility] [place] [id number]    HPI:   Ms. Hancock is a 67-year-old lady who presented today for evaluation of approximately 3 weeks of left foot pain which began insidiously.  She stated initially she had pain over the dorsal aspect of her foot which then began to radiate to the lateral to metatarsal heads.  She had bruising and swelling which has improved.  Walking increases her symptoms while elevation decreases them.  She stated, "when I wake up in the morning it feels great until I have been on for a while ".  She has taken extra strength Tylenol with help.  She has also soak her foot in Epson salts with help.  She has not done physical therapy nor worn a brace.    Past Medical History:   Diagnosis Date    Allergy     Angio-edema     Melanoma  left posterior flank treated with excision      *  *  * Thyroid nodule  Hypertension  Hyperlipidemia   GERD      Past Surgical History:   Procedure Laterality Date    APPENDECTOMY      HYSTERECTOMY      EXCISION LEFT POSTERIOR FLANK MELANOMA WITH LYMPH NODE BIOPSY Left    * COLONOSCOPY       Review of patient's allergies indicates:  No Known Allergies    Social History     Occupational History     Retired space shuttle external feel [employer]    Tobacco Use    Smoking status: Former     Packs/day: 1.00     Years: 10.00     Pack years: 10.00     Types: Cigarettes     Start date: 1981     Quit date: 1991     Years since quittin.4    Smokeless tobacco: Never   Substance and Sexual Activity    Alcohol use: Yes     Alcohol/week: 2.0 standard drinks     Types: 2 Glasses of wine per week    Drug use: Never    Sexual activity: Not Currently     Partners: Male     Comment: Was on Birth Control Pills      Family History   Problem Relation Age of Onset    Osteoarthritis Mother     " Arthritis Mother     Heart disease Father     Breast cancer Maternal Aunt     Allergies Brother     Allergic rhinitis Neg Hx     Angioedema Neg Hx     Asthma Neg Hx     Atopy Neg Hx     Eczema Neg Hx     Immunodeficiency Neg Hx     Rhinitis Neg Hx     Urticaria Neg Hx        Previous Hospitalizations:   Appendicitis    ROS:   Review of Systems   Constitutional: Negative for chills, decreased appetite and fever.   HENT:  Negative for congestion.    Eyes:  Negative for blurred vision and double vision.   Cardiovascular:  Negative for chest pain and cyanosis.   Respiratory:  Negative for cough and shortness of breath.    Endocrine: Negative for polydipsia.   Hematologic/Lymphatic: Negative for adenopathy.   Skin:  Positive for skin cancer. Negative for flushing and itching.   Musculoskeletal:  Negative for falls and gout.   Gastrointestinal:  Negative for constipation, diarrhea and heartburn.   Genitourinary:  Negative for nocturia.   Neurological:  Negative for headaches and seizures.   Psychiatric/Behavioral:  Negative for depression and substance abuse. The patient does not have insomnia and is not nervous/anxious.    Allergic/Immunologic: Positive for environmental allergies.         Objective:      Physical Exam:   General: AAOx3.  No acute distress  HEENT: Normocephalic, PEARLA EOMI, Good Dentition  Neck: Supple, No JVD  Chest: Symetric, equal excursion on inspiration  Abdomen: Soft NTND  Vascular:  Pulses intact and equal bilaterally.  Capillary refill less than 3 seconds and equal bilaterally  Neurologic:  Pinprick and soft touch intact and equal bilaterally  Integment:  No ecchymosis, no errythema  Extremity:  Ankle/foot:  Dorsiflexion / plantar flexion equal bilaterally 22/30 degrees.  Inversion/ eversion equal bilaterally.  Strength equal bilaterally with resisted dorsiflexion / plantar flexion/ inversion/ eversion.  Nontender at the ATFL bilaterally.  Nontender at the deltoid ligament bilaterally.  Drawer  equal bilaterally with endpoint.  Nontender at the Achilles insertion on the calcaneus bilaterally.  Achilles palpable throughout full distribution bilaterally.  Nontender at the plantar fascia insertion on the calcaneus bilaterally.  Windlass negative bilaterally.  Nontender at the Lisfranc interval bilaterally.  No swelling at the Lisfranc interval bilaterally.  Tender with palpation 5th metatarsal head left foot.  Tender with palpation 4th metatarsal head left foot.  Mild tenderness with toe motion.  Prominence dorsal aspect 1st MTP left foot consistent with arthritic changes.  Tender with motion 1st MTP left foot.  Radiography:  Personally reviewed  X-rays of the left foot completed on 06/01/2023 showed cortical irregularity consistent with callus formation with a 5th metatarsal neck stress fracture and arthritic changes of the 1st MTP.      Assessment:       Impression:      1. Stress fracture of left foot, initial encounter    2. Foot pain, left          Plan:       1.  Discussed physical examination and radiographic findings with the patient. Mi understands that she  appears to have a stress fracture of the 5th metatarsal neck and possibly the 4th metatarsal neck of her left foot.  Treatment alternatives and outcomes were discussed with the patient she understands she could be treated conservatively with observation, activity modification, NSAIDs, bracing, physical therapy, injections, or she could consider surgical intervention such as ORIF.  In order to fully evaluate her foot an MRI is warranted as it does appear she has an occult 2nd metatarsal neck fracture also.  2. Cam walker left foot.  The patient stated she has a Cam walker at home that she will wear.    3.  Discussed with the patient placing an arch support in her cam walker.    4.  Walk with crutches/ walker/ knee scooter nonweightbearing to the left foot.    5. Any pain can be treated with over-the-counter medications dosed per box  instructions.    6. Refer for an MRI of the left foot.  7. Final recommendations after MRI is completed.  8.   A prescription for the patient to obtain a knee scooter was prepared for her.  9. A prescription for the patient to obtain a walker was prepared for her.    10.Follow up after MRI.

## 2023-06-13 ENCOUNTER — HOSPITAL ENCOUNTER (OUTPATIENT)
Dept: RADIOLOGY | Facility: HOSPITAL | Age: 68
Discharge: HOME | End: 2023-06-13
Attending: FAMILY MEDICINE
Payer: MEDICARE

## 2023-06-13 DIAGNOSIS — Z12.31 SCREENING MAMMOGRAM FOR BREAST CANCER: ICD-10-CM

## 2023-06-13 PROCEDURE — 77063 BREAST TOMOSYNTHESIS BI: CPT | Mod: 26,,, | Performed by: RADIOLOGY

## 2023-06-13 PROCEDURE — 77063 MAMMO DIGITAL SCREENING BILAT WITH TOMO: ICD-10-PCS | Mod: 26,,, | Performed by: RADIOLOGY

## 2023-06-13 PROCEDURE — 77067 SCR MAMMO BI INCL CAD: CPT | Mod: 26,,, | Performed by: RADIOLOGY

## 2023-06-13 PROCEDURE — 77067 SCR MAMMO BI INCL CAD: CPT | Mod: TC

## 2023-06-13 PROCEDURE — 77067 MAMMO DIGITAL SCREENING BILAT WITH TOMO: ICD-10-PCS | Mod: 26,,, | Performed by: RADIOLOGY

## 2023-06-15 ENCOUNTER — HOSPITAL ENCOUNTER (OUTPATIENT)
Dept: RADIOLOGY | Facility: HOSPITAL | Age: 68
Discharge: HOME | End: 2023-06-15
Attending: ORTHOPAEDIC SURGERY
Payer: MEDICARE

## 2023-06-15 DIAGNOSIS — M84.375A STRESS FRACTURE OF LEFT FOOT, INITIAL ENCOUNTER: ICD-10-CM

## 2023-06-15 PROCEDURE — 73718 MRI FOOT (FOREFOOT) LEFT WITHOUT CONTRAST: ICD-10-PCS | Mod: 26,LT,, | Performed by: RADIOLOGY

## 2023-06-15 PROCEDURE — 73718 MRI LOWER EXTREMITY W/O DYE: CPT | Mod: TC,LT

## 2023-06-15 PROCEDURE — 73718 MRI LOWER EXTREMITY W/O DYE: CPT | Mod: 26,LT,, | Performed by: RADIOLOGY

## 2023-06-21 ENCOUNTER — OFFICE VISIT (OUTPATIENT)
Dept: ORTHOPEDICS | Facility: CLINIC | Age: 68
End: 2023-06-21
Payer: MEDICARE

## 2023-06-21 VITALS — WEIGHT: 209.19 LBS | RESPIRATION RATE: 16 BRPM | HEIGHT: 67 IN | BODY MASS INDEX: 32.83 KG/M2

## 2023-06-21 DIAGNOSIS — M84.375D STRESS FRACTURE OF LEFT FOOT WITH ROUTINE HEALING, SUBSEQUENT ENCOUNTER: Primary | ICD-10-CM

## 2023-06-21 DIAGNOSIS — M19.079 ARTHRITIS OF GREAT TOE AT METATARSOPHALANGEAL JOINT: ICD-10-CM

## 2023-06-21 DIAGNOSIS — M79.672 FOOT PAIN, LEFT: ICD-10-CM

## 2023-06-21 PROCEDURE — 99024 PR POST-OP FOLLOW-UP VISIT: ICD-10-PCS | Mod: POP,,, | Performed by: ORTHOPAEDIC SURGERY

## 2023-06-21 PROCEDURE — 99999 PR PBB SHADOW E&M-EST. PATIENT-LVL III: CPT | Mod: PBBFAC,,, | Performed by: ORTHOPAEDIC SURGERY

## 2023-06-21 PROCEDURE — 99999 PR PBB SHADOW E&M-EST. PATIENT-LVL III: ICD-10-PCS | Mod: PBBFAC,,, | Performed by: ORTHOPAEDIC SURGERY

## 2023-06-21 PROCEDURE — 99213 OFFICE O/P EST LOW 20 MIN: CPT | Mod: PBBFAC | Performed by: ORTHOPAEDIC SURGERY

## 2023-06-21 PROCEDURE — 99024 POSTOP FOLLOW-UP VISIT: CPT | Mod: POP,,, | Performed by: ORTHOPAEDIC SURGERY

## 2023-06-21 NOTE — PROGRESS NOTES
Subjective:      Patient ID: Mi Hancock is a 67 y.o. female.    Chief Complaint: Pain and Results of the Left Foot      HPI:  Ms. Hancock returned today for re-evaluation of her left foot.  She was last seen on 06/09/2023 for about 1 month of left foot pain which had ecchymosis and swelling of insidious onset.  At her last visit she was diagnosed with a stress fracture of the 5th metatarsal she has been placed in a Cam walker and requested to be nonweightbearing.  She walked in today without crutches walker or knee scooter.  She stated she still has pain in her foot.  She has been wearing the cam walker.    ROS:  No new diagnosis/surgery/prescriptions since last office visit on 06/09/2023.  Constitutional: Negative for chills, decreased appetite and fever.   HENT:  Negative for congestion.    Eyes:  Negative for blurred vision and double vision.   Cardiovascular:  Negative for chest pain and cyanosis.   Respiratory:  Negative for cough and shortness of breath.    Endocrine: Negative for polydipsia.   Hematologic/Lymphatic: Negative for adenopathy.   Skin:  Positive for skin cancer. Negative for flushing and itching.   Musculoskeletal:  Negative for falls and gout.   Gastrointestinal:  Negative for constipation, diarrhea and heartburn.   Genitourinary:  Negative for nocturia.   Neurological:  Negative for headaches and seizures.   Psychiatric/Behavioral:  Negative for depression and substance abuse. The patient does not have insomnia and is not nervous/anxious.    Allergic/Immunologic: Positive for environmental allergies.       Objective:      Physical Exam:   General: AAOx3.  No acute distress  Vascular:  Pulses intact and equal bilaterally.  Capillary refill less than 3 seconds and equal bilaterally  Neurologic:  Pinprick and soft touch intact and equal bilaterally  Integment:  No ecchymosis, no errythema  Extremity:  Ankle/foot:  Dorsiflexion / plantar flexion equal bilaterally 22/30 degrees.  Inversion/ eversion  equal bilaterally.  Strength equal bilaterally with resisted dorsiflexion / plantar flexion/ inversion/ eversion.  Nontender at the ATFL bilaterally.  Nontender at the deltoid ligament bilaterally.  Drawer equal bilaterally with endpoint.  Nontender at the Achilles insertion on the calcaneus bilaterally.  Achilles palpable throughout full distribution bilaterally.  Nontender at the plantar fascia insertion on the calcaneus bilaterally.  Windlass negative bilaterally.  Nontender at the Lisfranc interval bilaterally.  No swelling at the Lisfranc interval bilaterally.  Tender with palpation 5th metatarsal head left foot.  Tender with palpation 4th metatarsal head left foot.  Mild tenderness with toe motion.  Prominence dorsal aspect 1st MTP left foot consistent with arthritic changes.  Tender with motion 1st MTP left foot.  Radiography:  Personally reviewed MRI of the left foot completed on 06/15/2023 showed a nondisplaced stress fracture of the 5th metatarsal neck and a 1st metatarsal phalangeal arthritic changes.      Assessment:       Impression:     1. Fifth metatarsal neck stress fracture, left foot.   2.   3.  First MTP arthritis, left foot.    Left foot pain.         Plan:       1.  Discussed physical examination and radiographic findings with the patient. Mi understands that she has an MRI documented 5th metatarsal neck stress fracture.  Treatment alternatives and outcomes were discussed with the patient she understands she could be treated conservatively with observation, activity modification, NSAIDs, bracing, physical therapy, injections, or she could consider surgical intervention such as ORIF.  Recommend she continue with conservative management but she understands she needs to stay nonweightbearing on her foot and also wear her cam walker at all times and sleep in her Cam walker she needs to treat it like a cast in order to resolve her 5th metatarsal issues.    2. Continue with Cam walker, wear it at  all times treat it like a cast only remove it for hygiene and then immediately place it back on foot.  Sleep in cam walker.    3. Walk with crutches/walker/knee scooter nonweightbearing to the left foot.  This was stressed with the patient the importance of being nonweightbearing.  4. Any pain can be treated with over-the-counter medications dosed per box instructions.    5. Seated activities only no lifting/pushing/pulling/climbing which requires the use of the left foot.  No activities on ladders/scaffolding/stairs.  No standing requiring the use of the left foot.  6. Follow up in 1 month with an x-ray of the left foot.

## 2023-07-27 DIAGNOSIS — M84.375D STRESS FRACTURE OF LEFT FOOT WITH ROUTINE HEALING, SUBSEQUENT ENCOUNTER: Primary | ICD-10-CM

## 2023-07-28 ENCOUNTER — OFFICE VISIT (OUTPATIENT)
Dept: ORTHOPEDICS | Facility: CLINIC | Age: 68
End: 2023-07-28
Payer: MEDICARE

## 2023-07-28 ENCOUNTER — HOSPITAL ENCOUNTER (OUTPATIENT)
Dept: RADIOLOGY | Facility: HOSPITAL | Age: 68
Discharge: HOME | End: 2023-07-28
Attending: ORTHOPAEDIC SURGERY
Payer: MEDICARE

## 2023-07-28 VITALS — BODY MASS INDEX: 32.83 KG/M2 | HEIGHT: 67 IN | RESPIRATION RATE: 16 BRPM | WEIGHT: 209.19 LBS

## 2023-07-28 DIAGNOSIS — M84.375D STRESS FRACTURE OF LEFT FOOT WITH ROUTINE HEALING, SUBSEQUENT ENCOUNTER: Primary | ICD-10-CM

## 2023-07-28 DIAGNOSIS — M84.375D STRESS FRACTURE OF LEFT FOOT WITH ROUTINE HEALING, SUBSEQUENT ENCOUNTER: ICD-10-CM

## 2023-07-28 PROCEDURE — 99999 PR PBB SHADOW E&M-EST. PATIENT-LVL III: CPT | Mod: PBBFAC,,, | Performed by: ORTHOPAEDIC SURGERY

## 2023-07-28 PROCEDURE — 73630 X-RAY EXAM OF FOOT: CPT | Mod: TC,PN,LT

## 2023-07-28 PROCEDURE — 73630 X-RAY EXAM OF FOOT: CPT | Mod: 26,LT,, | Performed by: RADIOLOGY

## 2023-07-28 PROCEDURE — 99024 POSTOP FOLLOW-UP VISIT: CPT | Mod: POP,,, | Performed by: ORTHOPAEDIC SURGERY

## 2023-07-28 PROCEDURE — 73630 XR FOOT COMPLETE 3 VIEW LEFT: ICD-10-PCS | Mod: 26,LT,, | Performed by: RADIOLOGY

## 2023-07-28 PROCEDURE — 99213 OFFICE O/P EST LOW 20 MIN: CPT | Mod: PBBFAC,PN | Performed by: ORTHOPAEDIC SURGERY

## 2023-07-28 PROCEDURE — 99999 PR PBB SHADOW E&M-EST. PATIENT-LVL III: ICD-10-PCS | Mod: PBBFAC,,, | Performed by: ORTHOPAEDIC SURGERY

## 2023-07-28 PROCEDURE — 99024 PR POST-OP FOLLOW-UP VISIT: ICD-10-PCS | Mod: POP,,, | Performed by: ORTHOPAEDIC SURGERY

## 2023-07-28 NOTE — PROGRESS NOTES
Patient ID: Mi Hancock is a 67 y.o. female.     Chief Complaint: Pain and Results of the Left Foot        HPI:  Ms. Hancock returned today for a near 3 month follow-up re-evaluation of her left foot.  She stated her foot is relatively pain-free she has been nonweightbearing and has been using her Cam walker.  On her initial evaluation on 06/09/2023 she had 1 month of left foot pain which had ecchymosis and swelling of insidious onset.  She was treated with a cam walker and has been in a Cam walker since she has also been nonweightbearing since.  .     ROS:  No new diagnosis/surgery/prescriptions since last office visit on 06/21/2023  Constitutional: Negative for chills, decreased appetite and fever.   HENT:  Negative for congestion.    Eyes:  Negative for blurred vision and double vision.   Cardiovascular:  Negative for chest pain and cyanosis.   Respiratory:  Negative for cough and shortness of breath.    Endocrine: Negative for polydipsia.   Hematologic/Lymphatic: Negative for adenopathy.   Skin:  Positive for skin cancer. Negative for flushing and itching.   Musculoskeletal:  Negative for falls and gout.   Gastrointestinal:  Negative for constipation, diarrhea and heartburn.   Genitourinary:  Negative for nocturia.   Neurological:  Negative for headaches and seizures.   Psychiatric/Behavioral:  Negative for depression and substance abuse. The patient does not have insomnia and is not nervous/anxious.    Allergic/Immunologic: Positive for environmental allergies.         Objective:     Physical Exam:   General: AAOx3.  No acute distress  Vascular:  Pulses intact and equal bilaterally.  Capillary refill less than 3 seconds and equal bilaterally  Neurologic:  Pinprick and soft touch intact and equal bilaterally  Integment:  No ecchymosis, no errythema  Extremity:  Ankle/foot:  Dorsiflexion / plantar flexion equal bilaterally 22/30 degrees.  Inversion/ eversion equal bilaterally.  Strength equal bilaterally with  resisted dorsiflexion / plantar flexion/ inversion/ eversion.  Nontender at the ATFL bilaterally.  Nontender at the deltoid ligament bilaterally.  Drawer equal bilaterally with endpoint.  Nontender at the Achilles insertion on the calcaneus bilaterally.  Achilles palpable throughout full distribution bilaterally.  Nontender at the plantar fascia insertion on the calcaneus bilaterally.  Windlass negative bilaterally.  Nontender at the Lisfranc interval bilaterally.  No swelling at the Lisfranc interval bilaterally.  Mild tenderness with palpation 5th metatarsal head left foot.  Mild tenderness with toe motion left foot.  Prominence dorsal aspect 1st MTP left foot consistent with arthritic changes.  Tender with motion 1st MTP left foot.  Radiography:  Personally reviewed x-rays of the left foot completed on 07/28/2023 showed copious callus around a relatively nondisplaced 5th metatarsal neck fracture and arthritic changes of the 1st MTP joint with osteophytes.      Assessment:      1. Healing 5th metatarsal neck stress fracture, left foot   2.   3.  First MTP arthritis, left foot.    Resolving left foot pain      Plan:     1.  Discussed physical examination and radiographic findings with the patient. Mi understands that she is healing her 5th metatarsal stress fracture.  2. Continue nonweightbearing for 2 more weeks then start a progressive weight-bearing program with 25% weight increase by 25% each week until at 100% weight-bearing.  She understands she must have her Cam walker on at all times when bearing weight and she must also use 2 crutches or a walker.  Once she is at 100% weight-bearing she can discontinue the crutches or walker.  That she has a sudden increase in pain while doing her progressive weight-bearing she should stop bearing weight and call the office.  3. Wear cam walker at all times when ambulating may remove it to sleep once she is 1-2 weeks at 100% weight-bearing she can transition into a hard  sole shoe with an arch support.    4. Any minor pain can be treated with over-the-counter medications dosed per box instructions.  5. Continue with seated activities only no lifting/pushing/pulling/climbing requiring the use of the left foot no activities on ladders/scaffolding/stairs no sports.    6. Refer to physical therapy to start physical therapy in 2 weeks with the progressive weight-bearing program.    7. Follow up in 6 weeks with an x-ray of the left foot expect to advanced the patient to full activities without restrictions at her next follow-up

## 2023-08-15 ENCOUNTER — CLINICAL SUPPORT (OUTPATIENT)
Dept: REHABILITATION | Facility: HOSPITAL | Age: 68
End: 2023-08-15
Attending: ORTHOPAEDIC SURGERY
Payer: MEDICARE

## 2023-08-15 DIAGNOSIS — M84.375D STRESS FRACTURE OF LEFT FOOT WITH ROUTINE HEALING, SUBSEQUENT ENCOUNTER: ICD-10-CM

## 2023-08-15 DIAGNOSIS — R26.9 GAIT ABNORMALITY: Primary | ICD-10-CM

## 2023-08-15 PROCEDURE — 97161 PT EVAL LOW COMPLEX 20 MIN: CPT | Mod: PN

## 2023-08-15 PROCEDURE — 97110 THERAPEUTIC EXERCISES: CPT | Mod: PN

## 2023-08-16 NOTE — PLAN OF CARE
[facility]  Physical Therapy Initial Evaluation    Name: Mi Hancock  Clinic Number: [id number]    Therapy Diagnosis:   Encounter Diagnoses   Name Primary?    Stress fracture of left foot with routine healing, subsequent encounter     Gait abnormality Yes     Physician: Cipriano Broderick DO    Physician Orders: PT Eval and Treat   Medical Diagnosis from Referral: M84.375D (ICD-10-CM) - Stress fracture of left foot with routine healing, subsequent encounter  Evaluation Date: 8/15/2023  Authorization Period Expiration: 12/30/23  Plan of Care Expiration: 11/13/23  Visit # / Visits authorized: 1/ 1  FOTO Visit #:  1/3    Time In: 12:35 pm  Time Out: 1:30 pm  Total Appointment Time (timed & untimed codes): 55 minutes    Precautions: Standard    Subjective   History of current condition - Mi reports: 1 month of left foot pain which had ecchymosis and swelling of insidious onset. Later she was diagnosed with a stress fracture of the distal 5th metatarsal left foot placed in a Cam walker and requested to be nonweightbearing. Pt has since progressed to full WB in CAM  boot no AD starting last week. Per Dr. Broderick's note she may discharge cam boot after 100% for 2 weeks.     Medical History:   Past Medical History:   Diagnosis Date    Allergy     Angio-edema     Melanoma     Thyroid nodule        Surgical History:   Mi Hancock  has a past surgical history that includes Hysterectomy; melanoma (Left); and Appendectomy.    Medications:   Mi has a current medication list which includes the following prescription(s): amlodipine, azelastine, estradiol, fexofenadine, hydrocortisone, rosuvastatin, sodium chloride 5%, and triamcinolone.    Allergies:   Review of patient's allergies indicates:  No Known Allergies     Imaging, EXAMINATION:  MRI FOOT (FOREFOOT) LEFT WITHOUT CONTRAST     CLINICAL HISTORY:  stress fracture;  Stress fracture, left foot, initial encounter for fracture     TECHNIQUE:  Routine  multiplanar non-contrast MR imaging of the left forefoot was performed.  Image quality is degraded by patient motion.     COMPARISON:  Left foot radiographs-06/01/2023     FINDINGS:  The most significant abnormality relates to the presence of a acute versus subacute minimally displaced fracture of the left 5th metatarsal neck with prominent edema like signal noted throughout the 5th metatarsal shaft and adjacent periosteal fluid and edema like signal noted in the soft tissues and musculature surrounding the 5th metatarsal shaft.  There is subcutaneous edema like signal and superficial fascial fluid over the dorsum of the midfoot and forefoot.     There is bunion formation at the great toe metatarsal head and advanced degenerative osteoarthritis of the great toe MTP joint in the form of periarticular osteophyte formation with full-thickness cartilage loss noted over the entire articular surface of the great toe metatarsal head and great toe proximal phalangeal base.  There is reactive edema like signal and degenerative subcortical cyst formation observed at the opposing articular surfaces of the great toe metatarsal head and proximal phalanx.  There is a moderate great toe MTP joint effusion.     The imaged plantar fascia is unremarkable.  The Lisfranc ligament is unremarkable.  No intermetatarsal bursitis appreciated.  No erosions.  No fluid collections.     Impression:     1. Acute/recent 5th metatarsal neck stress fracture with adjacent reactive soft tissue inflammatory change/contusion.  2. 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        Electronically signed by: Serg Mesa MD  Date:                                            06/15/2023  Time:                                           13:44    Prior Therapy: None  Social History: Single story home. No steps. lives with their spouse  Occupation: Retired   Prior  "Level of Function: Independent   Current Level of Function: Indep however requires cam boot for ambulation    Pain:  Current 1/10, worst 4/10, best 0/10   Location: left along the lateral border of the foot/5th MTP  Description: Aching and Burning  Aggravating Factors: Pressure to outside of foot (cam boot strap), walking/standing for prolonged periods   Easing Factors: pain medication and rest    Pts goals: "walk without a boot and be able to exercise 2x wk"    Objective   Observation: Slight antalgic with cam boot    Edema: none    Ankle Range of Motion: AROM   Ankle Left   Dorsiflexion 15   Plantarflexion 50   Inversion 30   Eversion 30     Strength:  Ankle Left   Dorsiflexion 4+/5   Plantarflexion 4-/5   Inversion 4+/5   Eversion 4-/5   Knee  L: 5/5   Hip  L: 4+/5          Joint Mobility: Talocrural unrestricted,   Talar tilt unrestricted,     Palpation: minimal tenderness to palpation at distal 5th metatarsal    Sensation: No deficits     Flexibility:     90/90 SLR =  L no restriction   Ely's test: L moderate restriction   Joseph's test: L no restriction    PT Evaluation Completed: Yes       Limitation/Restriction for FOTO LEFS Survey    Therapist reviewed FOTO scores for Mi Hancock on 8/15/2023.   FOTO documents entered into [email or web address] - see Media section.    Limitation Score: 62/80         TREATMENT   Treatment Time In: 1:15 pm  Treatment Time Out: 1:30 pm  Total Treatment time (time-based codes) separate from Evaluation: 15 minutes    Mi received the treatments listed below:    THERAPEUTIC EXERCISES to develop strength and flexibility for 15 minutes including   PF stretch 1 x 30"H  Seated Gastroc stretch 1 x 30"H  4 way ankle GTB x 10 ea  HEP provided and reviewed    Home Exercises and Patient Education Provided    Education provided:   - Goals and POC     Written Home Exercises Provided: yes.  Exercises were reviewed and Mi was able to demonstrate them prior to the end of the session.  Mi demonstrated good  " understanding of the education provided.     See EMR under Media for exercises provided 8/15/2023.    Assessment   Mi is a 67 y.o. female referred to outpatient Physical Therapy with a medical diagnosis of M84.375D (ICD-10-CM) - Stress fracture of left foot with routine healing, subsequent encounter. Pt presents with left foot pain and tenderness most present with WB, reduced AROM, deficits in strength, endurance and balance.  Pt prognosis is Good.   Pt will benefit from skilled outpatient Physical Therapy to address the deficits stated above and in the chart below, provide pt/family education, and to maximize pt's level of independence.     Plan of care discussed with patient: Yes  Pt's spiritual, cultural and educational needs considered and patient is agreeable to the plan of care and goals as stated below:     Anticipated Barriers for therapy: Prolonged Non-wb    Medical Necessity is demonstrated by the following  History  Co-morbidities and personal factors that may impact the plan of care Co-morbidities:   See above    Personal Factors:   no deficits     low   Examination  Body Structures and Functions, activity limitations and participation restrictions that may impact the plan of care Body Regions:   lower extremities    Body Systems:    ROM  strength  balance  gait    Participation Restrictions:   --    Activity limitations:   Learning and applying knowledge  no deficits    General Tasks and Commands  no deficits    Communication  no deficits    Mobility  walking  driving (bike, car, motorcycle)    Self care  no deficits    Domestic Life  cooking  doing house work (cleaning house, washing dishes, laundry)    Interactions/Relationships  no deficits    Life Areas  no deficits    Community and Social Life  community life  recreation and leisure         low   Clinical Presentation stable and uncomplicated low   Decision Making/ Complexity Score: low     Goals:  Short Term Goals: 3 weeks   Pain: reduction in  pain to no more than 2/10 on a daily basis to allow for improved performance of daily activities  Strength: Pt will improve Left hip strength 5/5  Strength: Pt will improve Left ankle strength to 4+/5      Long Term Goals: 6 weeks   Pain: no more than 1/10 pain with activity  Activity:pt complete 30-45 mins of continuous standing activity /walking   FOTO: improved limitation score to 70/80  HEP: Independent with HEP for continued improvement in ROM/Strength.   Driving: able to drive without increased pain .  ROM: pt will improve AROM DF left ankle to 20 deg or better for for ascend/descend of stairs.  Strength: Pt will improve Left ankle strength by 5/5 grade  Balance: Maintain  SLS on left foot 30 sec or more    Plan   Plan of care Certification: 8/15/2023 to 11/13/23.    Outpatient Physical Therapy 2 times weekly for 8 weeks to include the following interventions: Gait Training, Manual Therapy, Moist Heat/ Ice, Neuromuscular Re-ed, Therapeutic Activities, and Therapeutic Exercise. Other modalities as appropriate.    Kristina Mosley, PT

## 2023-08-16 NOTE — PLAN OF CARE
PT/PTA met face to face to discuss pt's treatment plan and progress towards established goals. Pt will be seen by a physical therapist minimally every 6th visit or every 30 days.    Please see  Plan of Care dated 8/15/23 for goals.  -General strengthening of foot and ankle s/p 5th Metatarsal fracture     Kristina Mosley, PT

## 2023-08-22 ENCOUNTER — CLINICAL SUPPORT (OUTPATIENT)
Dept: REHABILITATION | Facility: HOSPITAL | Age: 68
End: 2023-08-22
Payer: MEDICARE

## 2023-08-22 DIAGNOSIS — R26.9 GAIT ABNORMALITY: ICD-10-CM

## 2023-08-22 DIAGNOSIS — M84.375D STRESS FRACTURE OF LEFT FOOT WITH ROUTINE HEALING, SUBSEQUENT ENCOUNTER: Primary | ICD-10-CM

## 2023-08-22 PROCEDURE — 97110 THERAPEUTIC EXERCISES: CPT | Mod: PN

## 2023-08-22 NOTE — PROGRESS NOTES
"[facility]   Physical Therapy Treatment Note      Name: Mi Hancock  Clinic Number: [id number]    Therapy Diagnosis: No diagnosis found.  Physician: Cipriano Broderick DO    Visit Date: 8/22/2023    Physician Orders: PT Eval and Treat   Medical Diagnosis from Referral: M84.375D (ICD-10-CM) - Stress fracture of left foot with routine healing, subsequent encounter  Evaluation Date: 8/15/2023  Authorization Period Expiration: 12/30/23  Plan of Care Expiration: 11/13/23  Visit # / Visits authorized: 1/ 1  FOTO Visit #:  1/3     Time In: 11:00 AM  Time Out: 11:45  AM  Total Appointment Time (timed & untimed codes): 45 minutes    PTA Visit #: 0/5       Subjective     Pt reports: Occasional stinging but no real pain lately. No boot.  She was compliant with home exercise program.  Response to previous treatment: Good  Functional change: Reduced discomfort    Pain: 0/10  Location: left foot    Objective      Objective Measures updated at progress report unless specified.     Treatment     Mi received the treatments listed below:      therapeutic exercises to develop strength, endurance, ROM, and flexibility for 45 minutes including:  Sci-fit level 2 x 10 mins  PF stretch 2 x 30"H  Seated Gastroc stretch 1 x 30"H  Seated PF MFR with tennis ball x 2 mins   Towel Scrunch x 2 mins  Seated heel raise with red med ball x 30   SLR x 15  SL hip ABD x 15  Prone Hip extension x 15   Prone hip extension x 15   4 way ankle GTB x 30 ea  Toe taps  x 1 min  Standing calf stretch 3 x 30 sec  SLS --  HEP provided and reviewed    Education provided:   - proper form with Home exercises    Written Home Exercises Provided: yes. Exercises were reviewed and Mi was able to demonstrate them prior to the end of the session.  Mi demonstrated good  understanding of the education provided. See EMR under Patient Instructions for exercises provided during therapy sessions    Assessment   Pt demonstrating improvements since " initial evaluation. Activities focused on improving WB tolerance, flexibility, general ankle and intrinsic foot strengthening. No exacerbations reported. Will continue to advance pt as tolerated.    Mi Is progressing well towards her goals.   Pt prognosis is Good.     Pt will continue to benefit from skilled outpatient physical therapy to address the deficits listed in the problem list box on initial evaluation, provide pt/family education and to maximize pt's level of independence in the home and community environment.     Pt's spiritual, cultural and educational needs considered and pt agreeable to plan of care and goals.     Anticipated barriers to physical therapy: None    Goals:   Short Term Goals: 3 weeks   Pain: reduction in pain to no more than 2/10 on a daily basis to allow for improved performance of daily activities  Strength: Pt will improve Left hip strength 5/5  Strength: Pt will improve Left ankle strength to 4+/5      Long Term Goals: 6 weeks   Pain: no more than 1/10 pain with activity  Activity:pt complete 30-45 mins of continuous standing activity /walking   FOTO: improved limitation score to 70/80  HEP: Independent with HEP for continued improvement in ROM/Strength.   Driving: able to drive without increased pain .  ROM: pt will improve AROM DF left ankle to 20 deg or better for for ascend/descend of stairs.  Strength: Pt will improve Left ankle strength by 5/5 grade  Balance: Maintain  SLS on left foot 30 sec or more    Plan     Plan of care Certification: 8/15/2023 to 11/13/23.     Outpatient Physical Therapy 2 times weekly for 8 weeks to include the following interventions: Gait Training, Manual Therapy, Moist Heat/ Ice, Neuromuscular Re-ed, Therapeutic Activities, and Therapeutic Exercise. Other modalities as appropriate.    Kristina Mosley, PT

## 2023-08-24 ENCOUNTER — CLINICAL SUPPORT (OUTPATIENT)
Dept: REHABILITATION | Facility: HOSPITAL | Age: 68
End: 2023-08-24
Payer: MEDICARE

## 2023-08-24 DIAGNOSIS — M84.375D STRESS FRACTURE OF LEFT FOOT WITH ROUTINE HEALING, SUBSEQUENT ENCOUNTER: Primary | ICD-10-CM

## 2023-08-24 DIAGNOSIS — R26.9 GAIT ABNORMALITY: ICD-10-CM

## 2023-08-24 PROCEDURE — 97110 THERAPEUTIC EXERCISES: CPT | Mod: PN

## 2023-08-24 NOTE — PROGRESS NOTES
"[facility]   Physical Therapy Treatment Note      Name: Mi Hancock  Clinic Number: [id number]    Therapy Diagnosis:   Encounter Diagnoses   Name Primary?    Stress fracture of left foot with routine healing, subsequent encounter Yes    Gait abnormality      Physician: Cipriano Broderick DO    Visit Date: 8/24/2023    Physician Orders: PT Eval and Treat   Medical Diagnosis from Referral: M84.375D (ICD-10-CM) - Stress fracture of left foot with routine healing, subsequent encounter  Evaluation Date: 8/15/2023  Authorization Period Expiration: 12/30/23  Plan of Care Expiration: 11/13/23  Visit # / Visits authorized: 1/ 1  FOTO Visit #:  1/3     Time In: 9:00 AM  Time Out: 10:00 AM  Total Appointment Time (timed & untimed codes): 53 minutes    PTA Visit #: 0/5       Subjective     Pt reports: A little sore after last session but feel.  She was compliant with home exercise program.  Response to previous treatment: Good  Functional change: Reduced discomfort    Pain: 0/10  Location: left foot    Objective      Objective Measures updated at progress report unless specified.     Treatment     Mi received the treatments listed below:      therapeutic exercises to develop strength, endurance, ROM, and flexibility for 53 minutes including:  Sci-fit level 3 x 15 mins  PF stretch 3 x 30"H  Seated Gastroc stretch 1 x 30"H  Seated PF MFR with tennis ball x 2 mins   Towel Scrunch x 2 mins  Seated heel raise with red med ball x 30   SLR L x 15  SL hip ABD L x 15  Prone Hip extension Lx 15   4 way ankle GTB x 30 ea  Toe taps  x 2 min  Standing calf stretch 3 x 30 sec  SLS 2 x 30 sec  HEP provided and reviewed    Education provided:   - proper form with Home exercises    Written Home Exercises Provided: yes. Exercises were reviewed and Mi was able to demonstrate them prior to the end of the session.  Mi demonstrated good  understanding of the education provided. See EMR under Patient Instructions for " exercises provided during therapy sessions    Assessment   Pt demonstrating improvements since initial evaluation with improved gait mechanics. Activities focused on improving WB tolerance, flexibility, general ankle, balance and intrinsic foot strengthening. No exacerbations reported. Will continue to advance pt as tolerated.    Mi Is progressing well towards her goals.   Pt prognosis is Good.     Pt will continue to benefit from skilled outpatient physical therapy to address the deficits listed in the problem list box on initial evaluation, provide pt/family education and to maximize pt's level of independence in the home and community environment.     Pt's spiritual, cultural and educational needs considered and pt agreeable to plan of care and goals.     Anticipated barriers to physical therapy: None    Goals:   Short Term Goals: 3 weeks   Pain: reduction in pain to no more than 2/10 on a daily basis to allow for improved performance of daily activities  Strength: Pt will improve Left hip strength 5/5  Strength: Pt will improve Left ankle strength to 4+/5      Long Term Goals: 6 weeks   Pain: no more than 1/10 pain with activity  Activity:pt complete 30-45 mins of continuous standing activity /walking   FOTO: improved limitation score to 70/80  HEP: Independent with HEP for continued improvement in ROM/Strength.   Driving: able to drive without increased pain .  ROM: pt will improve AROM DF left ankle to 20 deg or better for for ascend/descend of stairs.  Strength: Pt will improve Left ankle strength by 5/5 grade  Balance: Maintain  SLS on left foot 30 sec or more    Plan     Plan of care Certification: 8/15/2023 to 11/13/23.     Outpatient Physical Therapy 2 times weekly for 8 weeks to include the following interventions: Gait Training, Manual Therapy, Moist Heat/ Ice, Neuromuscular Re-ed, Therapeutic Activities, and Therapeutic Exercise. Other modalities as appropriate.    Kristina Mosley, PT

## 2023-08-29 ENCOUNTER — CLINICAL SUPPORT (OUTPATIENT)
Dept: REHABILITATION | Facility: HOSPITAL | Age: 68
End: 2023-08-29
Payer: MEDICARE

## 2023-08-29 DIAGNOSIS — M84.375D STRESS FRACTURE OF LEFT FOOT WITH ROUTINE HEALING, SUBSEQUENT ENCOUNTER: ICD-10-CM

## 2023-08-29 DIAGNOSIS — R26.9 GAIT ABNORMALITY: Primary | ICD-10-CM

## 2023-08-29 PROCEDURE — 97530 THERAPEUTIC ACTIVITIES: CPT | Mod: PN

## 2023-08-29 PROCEDURE — 97110 THERAPEUTIC EXERCISES: CPT | Mod: PN

## 2023-08-29 NOTE — PROGRESS NOTES
"[facility]   Physical Therapy Treatment Note      Name: Mi Hancock  Clinic Number: [id number]    Therapy Diagnosis:   Encounter Diagnoses   Name Primary?    Gait abnormality Yes    Stress fracture of left foot with routine healing, subsequent encounter        Physician: Cipriano Broderick DO    Visit Date: 8/29/2023    Physician Orders: PT Eval and Treat   Medical Diagnosis from Referral: M84.375D (ICD-10-CM) - Stress fracture of left foot with routine healing, subsequent encounter  Evaluation Date: 8/15/2023  Authorization Period Expiration: 12/30/23  Plan of Care Expiration: 11/13/23  Visit # / Visits authorized: 1/ 1  FOTO Visit #:  1/3     Time In: 10:58 AM  Time Out: 11:51 AM  Total Appointment Time (timed & untimed codes): 53 minutes    PTA Visit #: 0/5       Subjective     Pt reports: A little sore after last session but feel ok  She was compliant with home exercise program.  Response to previous treatment: Good  Functional change: Reduced discomfort    Pain: 2-3/10  Location: left foot    Objective      Objective Measures updated at progress report unless specified.     Treatment     Mi received the treatments listed below:      therapeutic exercises to develop strength, endurance, ROM, and flexibility for 45 minutes including:  Sci-fit level 3 x 15 mins  PF stretch 3 x 30"H  Seated Gastroc stretch 1 x 30"H  Seated PF MFR with tennis ball x 2 mins   Towel Scrunch x 2 mins  [place] pick ups x 2 mins  Seated heel raise with red med ball x 40   SLR L x 20  SL hip ABD L x 20  Prone Hip extension Lx 20  4 way ankle GTB x 30 ea  Standing calf stretch 3 x 30 sec  SLS 2 x 30 sec    Therapeutic activities x 8 mins  Toe taps  x 2 min  Narrowed stance on foam  2 x 30 sec   Tandem stance on firm surface 2 x 30 sec   Side stepping x 4 laps 10 ft      Education provided:   - proper form with Home exercises    Written Home Exercises Provided: yes. Exercises were reviewed and Mi was able to " demonstrate them prior to the end of the session.  Mi demonstrated good  understanding of the education provided. See EMR under Patient Instructions for exercises provided during therapy sessions    Assessment   Activities focused on improving WB tolerance, flexibility, general ankle, balance and intrinsic foot strengthening. No exacerbations reported.  Progressing well.Will continue to advance pt as tolerated.    Mi Is progressing well towards her goals.   Pt prognosis is Good.     Pt will continue to benefit from skilled outpatient physical therapy to address the deficits listed in the problem list box on initial evaluation, provide pt/family education and to maximize pt's level of independence in the home and community environment.     Pt's spiritual, cultural and educational needs considered and pt agreeable to plan of care and goals.     Anticipated barriers to physical therapy: None    Goals:   Short Term Goals: 3 weeks   Pain: reduction in pain to no more than 2/10 on a daily basis to allow for improved performance of daily activities  Strength: Pt will improve Left hip strength 5/5  Strength: Pt will improve Left ankle strength to 4+/5      Long Term Goals: 6 weeks   Pain: no more than 1/10 pain with activity  Activity:pt complete 30-45 mins of continuous standing activity /walking   FOTO: improved limitation score to 70/80  HEP: Independent with HEP for continued improvement in ROM/Strength.   Driving: able to drive without increased pain .  ROM: pt will improve AROM DF left ankle to 20 deg or better for for ascend/descend of stairs.  Strength: Pt will improve Left ankle strength by 5/5 grade  Balance: Maintain  SLS on left foot 30 sec or more    Plan     Plan of care Certification: 8/15/2023 to 11/13/23.     Outpatient Physical Therapy 2 times weekly for 8 weeks to include the following interventions: Gait Training, Manual Therapy, Moist Heat/ Ice, Neuromuscular Re-ed, Therapeutic Activities, and  Therapeutic Exercise. Other modalities as appropriate.    Kristina Mosley, PT

## 2023-08-30 ENCOUNTER — TELEPHONE (OUTPATIENT)
Dept: DERMATOLOGY | Facility: CLINIC | Age: 68
End: 2023-08-30
Payer: MEDICARE

## 2023-08-30 NOTE — TELEPHONE ENCOUNTER
----- Message from Mallory Chan MD sent at 8/30/2023  1:20 PM CDT -----  Received this as a referral for MM history  Please schedule skin check    ----- Message -----  From: Fawn Campuzano MA  Sent: 8/30/2023  10:16 AM CDT  To: Mallory Chan MD

## 2023-09-05 ENCOUNTER — CLINICAL SUPPORT (OUTPATIENT)
Dept: REHABILITATION | Facility: HOSPITAL | Age: 68
End: 2023-09-05
Payer: MEDICARE

## 2023-09-05 DIAGNOSIS — R26.9 GAIT ABNORMALITY: Primary | ICD-10-CM

## 2023-09-05 PROCEDURE — 97110 THERAPEUTIC EXERCISES: CPT | Mod: PN,CQ

## 2023-09-05 PROCEDURE — 97530 THERAPEUTIC ACTIVITIES: CPT | Mod: PN,CQ

## 2023-09-05 NOTE — PROGRESS NOTES
"[facility]   Physical Therapy Treatment Note      Name: Mi Hancock  Clinic Number: [id number]    Therapy Diagnosis:   Encounter Diagnosis   Name Primary?    Gait abnormality Yes       Physician: Cipriano Broderick DO    Visit Date: 9/5/2023    Physician Orders: PT Eval and Treat   Medical Diagnosis from Referral: M84.375D (ICD-10-CM) - Stress fracture of left foot with routine healing, subsequent encounter  Evaluation Date: 8/15/2023  Authorization Period Expiration: 12/30/23  Plan of Care Expiration: 11/13/23  Visit # / Visits authorized: 4 / 16  FOTO Visit #:  1/3     Time In: 11:00 AM  Time Out: 12:05 PM  Total Appointment Time (timed & untimed codes): 65 minutes    PTA Visit #: 1/5       Subjective     Pt reports: I am feeling good, but I did notice increased pain going down the stairs leading with my left foot.  She was compliant with home exercise program.  Response to previous treatment: Good  Functional change: Reduced discomfort    Pain: 2-3/10  Location: left foot    Objective      Objective Measures updated at progress report unless specified.     Treatment     Mi received the treatments listed below:      therapeutic exercises to develop strength, endurance, ROM, and flexibility for 45 minutes including:  Sci-fit level 3 x 15 mins  PF/DF stretch 3 x 30"H  Seated Gastroc stretch 1 x 30"H  Seated PF MFR with tennis ball x 2 mins   Towel Scrunch x 2 mins  [place] pick ups x 2 mins  Seated heel raise with red med ball x 40   SLR L x 20  SL hip ABD L x 20  Prone Hip extension L x 20  4 way ankle GTB x 30 ea  Standing calf stretch 3 x 30 sec  SLS 2 x 30 sec    Therapeutic activities x 8 mins  Toe taps on 6" step x 2 min  Narrowed stance on foam  2 x 30 sec   Tandem stance on firm surface 2 x 30 sec   Side stepping x 4 laps 10 ft      Education provided:   - proper form with Home exercises    Written Home Exercises Provided: yes. Exercises were reviewed and Mi was able to " demonstrate them prior to the end of the session.  Mi demonstrated good  understanding of the education provided. See EMR under Patient Instructions for exercises provided during therapy sessions    Assessment   Activities focused on improving WB tolerance, flexibility, general ankle, balance and intrinsic foot strengthening. No exacerbations reported.  Progressing well. Will continue to advance pt as tolerated.    Mi Is progressing well towards her goals.   Pt prognosis is Good.     Pt will continue to benefit from skilled outpatient physical therapy to address the deficits listed in the problem list box on initial evaluation, provide pt/family education and to maximize pt's level of independence in the home and community environment.     Pt's spiritual, cultural and educational needs considered and pt agreeable to plan of care and goals.     Anticipated barriers to physical therapy: None    Goals:   Short Term Goals: 3 weeks   Pain: reduction in pain to no more than 2/10 on a daily basis to allow for improved performance of daily activities  Strength: Pt will improve Left hip strength 5/5  Strength: Pt will improve Left ankle strength to 4+/5      Long Term Goals: 6 weeks   Pain: no more than 1/10 pain with activity  Activity:pt complete 30-45 mins of continuous standing activity /walking   FOTO: improved limitation score to 70/80  HEP: Independent with HEP for continued improvement in ROM/Strength.   Driving: able to drive without increased pain .  ROM: pt will improve AROM DF left ankle to 20 deg or better for for ascend/descend of stairs.  Strength: Pt will improve Left ankle strength by 5/5 grade  Balance: Maintain  SLS on left foot 30 sec or more    Plan     Plan of care Certification: 8/15/2023 to 11/13/23.     Outpatient Physical Therapy 2 times weekly for 8 weeks to include the following interventions: Gait Training, Manual Therapy, Moist Heat/ Ice, Neuromuscular Re-ed, Therapeutic Activities, and  Therapeutic Exercise. Other modalities as appropriate.    Jonathan Favre, PTA

## 2023-09-11 ENCOUNTER — CLINICAL SUPPORT (OUTPATIENT)
Dept: REHABILITATION | Facility: HOSPITAL | Age: 68
End: 2023-09-11
Payer: MEDICARE

## 2023-09-11 DIAGNOSIS — R26.9 GAIT ABNORMALITY: Primary | ICD-10-CM

## 2023-09-11 PROCEDURE — 97140 MANUAL THERAPY 1/> REGIONS: CPT | Mod: PN

## 2023-09-11 PROCEDURE — 97110 THERAPEUTIC EXERCISES: CPT | Mod: PN

## 2023-09-11 NOTE — PROGRESS NOTES
"[facility]   Physical Therapy Treatment Note      Name: Mi Hancock  Clinic Number: [id number]    Therapy Diagnosis:   Encounter Diagnosis   Name Primary?    Gait abnormality Yes       Physician: Cipriano Broderick DO    Visit Date: 9/11/2023    Physician Orders: PT Eval and Treat   Medical Diagnosis from Referral: M84.375D (ICD-10-CM) - Stress fracture of left foot with routine healing, subsequent encounter  Evaluation Date: 8/15/2023  Authorization Period Expiration: 12/30/23  Plan of Care Expiration: 11/13/23  Visit # / Visits authorized: 5 / 16  FOTO Visit #:  1/3     Time In: 12:30 AM  Time Out: 1:30 PM  Total Appointment Time (timed & untimed codes): 60 minutes    PTA Visit #: 0/5       Subjective     Pt reports: I am feeling good no pain but I have occasional soreness across the top of my foot.  She was compliant with home exercise program.  Response to previous treatment: Good  Functional change: Reduced discomfort    Pain: 1/10  Location: left foot    Objective      Objective Measures updated at progress report unless specified.     Treatment     Mi received the treatments listed below:      therapeutic exercises to develop strength, endurance, ROM, and flexibility for 50 minutes including:  Sci-fit level 3 x 15 mins  PF/DF stretch 3 x 30"H  Seated Gastroc stretch 1 x 30"H  Seated PF MFR with tennis ball x 2 mins   Towel Scrunch x 2 mins  [place] pick ups x 2 mins  Seated toe raise x 2 mins  Seated heel raise with red med ball x 40   SLR L x 30  SL hip ABD L x 30  Prone Hip extension L x 20  4 way ankle BlueTB x 30 ea  Standing calf stretch 2 x 30 sec  SLS 2 x 30 sec  Standing heel raise 2 x 15    Manual 10min  STM Plantar fascia and dorsum of the left foot followed by passive stretching of DF's  to reduce tissue tightness- discomfort reduced.    Therapeutic activities x 8 mins  Toe taps on 6" step x 2 min  Narrowed stance on foam  2 x 30 sec   Tandem stance on firm surface 2 x 30 " sec   Side stepping x 4 laps 10 ft  5 mins treadmill at ___incline --initial      Education provided:   - proper form with Home exercises    Written Home Exercises Provided: yes. Exercises were reviewed and Mi was able to demonstrate them prior to the end of the session.  Mi demonstrated good  understanding of the education provided. See EMR under Patient Instructions for exercises provided during therapy sessions    Assessment   Activities focused on improving WB tolerance, flexibility, general ankle, balance and intrinsic foot strengthening. No exacerbations reported. Progressing well. Pt is tolerating more walking for exercise with reduced occurrence of pain.Will continue to advance pt as tolerated.    Mi Is progressing well towards her goals.   Pt prognosis is Good.     Pt will continue to benefit from skilled outpatient physical therapy to address the deficits listed in the problem list box on initial evaluation, provide pt/family education and to maximize pt's level of independence in the home and community environment.     Pt's spiritual, cultural and educational needs considered and pt agreeable to plan of care and goals.     Anticipated barriers to physical therapy: None    Goals:   Short Term Goals: 3 weeks   Pain: reduction in pain to no more than 2/10 on a daily basis to allow for improved performance of daily activities  Strength: Pt will improve Left hip strength 5/5  Strength: Pt will improve Left ankle strength to 4+/5      Long Term Goals: 6 weeks   Pain: no more than 1/10 pain with activity  Activity:pt complete 30-45 mins of continuous standing activity /walking   FOTO: improved limitation score to 70/80  HEP: Independent with HEP for continued improvement in ROM/Strength.   Driving: able to drive without increased pain .  ROM: pt will improve AROM DF left ankle to 20 deg or better for for ascend/descend of stairs.  Strength: Pt will improve Left ankle strength by 5/5 grade  Balance:  Maintain  SLS on left foot 30 sec or more    Plan     Plan of care Certification: 8/15/2023 to 11/13/23.     Outpatient Physical Therapy 2 times weekly for 8 weeks to include the following interventions: Gait Training, Manual Therapy, Moist Heat/ Ice, Neuromuscular Re-ed, Therapeutic Activities, and Therapeutic Exercise. Other modalities as appropriate.    Kristina Mosley, PT

## 2023-09-12 DIAGNOSIS — M84.375D STRESS FRACTURE OF LEFT FOOT WITH ROUTINE HEALING, SUBSEQUENT ENCOUNTER: Primary | ICD-10-CM

## 2023-09-13 ENCOUNTER — HOSPITAL ENCOUNTER (OUTPATIENT)
Dept: RADIOLOGY | Facility: HOSPITAL | Age: 68
Discharge: HOME | End: 2023-09-13
Attending: ORTHOPAEDIC SURGERY
Payer: MEDICARE

## 2023-09-13 DIAGNOSIS — M84.375D STRESS FRACTURE OF LEFT FOOT WITH ROUTINE HEALING, SUBSEQUENT ENCOUNTER: ICD-10-CM

## 2023-09-13 PROCEDURE — 73630 XR FOOT COMPLETE 3 VIEW LEFT: ICD-10-PCS | Mod: 26,LT,, | Performed by: RADIOLOGY

## 2023-09-13 PROCEDURE — 73630 X-RAY EXAM OF FOOT: CPT | Mod: TC,PN,LT

## 2023-09-13 PROCEDURE — 73630 X-RAY EXAM OF FOOT: CPT | Mod: 26,LT,, | Performed by: RADIOLOGY

## 2023-09-19 ENCOUNTER — OFFICE VISIT (OUTPATIENT)
Dept: ORTHOPEDICS | Facility: CLINIC | Age: 68
End: 2023-09-19
Payer: MEDICARE

## 2023-09-19 VITALS — HEIGHT: 67 IN | HEART RATE: 95 BPM | WEIGHT: 209 LBS | BODY MASS INDEX: 32.8 KG/M2 | OXYGEN SATURATION: 97 %

## 2023-09-19 DIAGNOSIS — M84.375D STRESS FRACTURE OF LEFT FOOT WITH ROUTINE HEALING, SUBSEQUENT ENCOUNTER: Primary | ICD-10-CM

## 2023-09-19 DIAGNOSIS — M19.079 ARTHRITIS OF GREAT TOE AT METATARSOPHALANGEAL JOINT: ICD-10-CM

## 2023-09-19 PROCEDURE — 99213 PR OFFICE/OUTPT VISIT, EST, LEVL III, 20-29 MIN: ICD-10-PCS | Mod: S$PBB,,, | Performed by: ORTHOPAEDIC SURGERY

## 2023-09-19 PROCEDURE — 99999 PR PBB SHADOW E&M-EST. PATIENT-LVL III: CPT | Mod: PBBFAC,,, | Performed by: ORTHOPAEDIC SURGERY

## 2023-09-19 PROCEDURE — 99999 PR PBB SHADOW E&M-EST. PATIENT-LVL III: ICD-10-PCS | Mod: PBBFAC,,, | Performed by: ORTHOPAEDIC SURGERY

## 2023-09-19 PROCEDURE — 99213 OFFICE O/P EST LOW 20 MIN: CPT | Mod: PBBFAC,PN | Performed by: ORTHOPAEDIC SURGERY

## 2023-09-19 PROCEDURE — 99213 OFFICE O/P EST LOW 20 MIN: CPT | Mod: S$PBB,,, | Performed by: ORTHOPAEDIC SURGERY

## 2023-09-19 NOTE — PROGRESS NOTES
Patient ID: Mi Hancock is a 67 y.o. female.     Chief Complaint: Pain and Results of the Left Foot        HPI:  Ms. Hancock returned today for a final follow-up re-evaluation of her left foot.  She is pain-free over the 5th metatarsal of her left foot.  She has been ambulating with full weight-bearing in a normal shoe she has been using an arch support.  Yesterday she walked a mi without pain.  On her initial evaluation on 06/09/2023 she had 1 month of left foot pain which had ecchymosis and swelling of insidious onset.  She was treated with a cam walker and at last visit she was started on a progressive weight-bearing program. .     ROS:  No new diagnosis/surgery/prescriptions since last office visit on 07/28/2023.  Constitutional: Negative for chills, decreased appetite and fever.   HENT:  Negative for congestion.    Eyes:  Negative for blurred vision and double vision.   Cardiovascular:  Negative for chest pain and cyanosis.   Respiratory:  Negative for cough and shortness of breath.    Endocrine: Negative for polydipsia.   Hematologic/Lymphatic: Negative for adenopathy.   Skin:  Positive for skin cancer. Negative for flushing and itching.   Musculoskeletal:  Negative for falls and gout.   Gastrointestinal:  Negative for constipation, diarrhea and heartburn.   Genitourinary:  Negative for nocturia.   Neurological:  Negative for headaches and seizures.   Psychiatric/Behavioral:  Negative for depression and substance abuse. The patient does not have insomnia and is not nervous/anxious.    Allergic/Immunologic: Positive for environmental allergies.         Objective:      Physical Exam:   General: AAOx3.  No acute distress  Vascular:  Pulses intact and equal bilaterally.  Capillary refill less than 3 seconds and equal bilaterally  Neurologic:  Pinprick and soft touch intact and equal bilaterally  Integment:  No ecchymosis, no errythema  Extremity:  Ankle/foot:  Dorsiflexion / plantar flexion equal bilaterally 22/30  degrees.  Inversion/ eversion equal bilaterally.  Strength equal bilaterally with resisted dorsiflexion / plantar flexion/ inversion/ eversion.  Nontender at the ATFL bilaterally.  Nontender at the deltoid ligament bilaterally.  Drawer equal bilaterally with endpoint.  Nontender at the Achilles insertion on the calcaneus bilaterally.  Achilles palpable throughout full distribution bilaterally.  Nontender at the plantar fascia insertion on the calcaneus bilaterally.  Windlass negative bilaterally.  Nontender at the Lisfranc interval bilaterally.  No swelling at the Lisfranc interval bilaterally.  Nontender with palpation 5th metatarsal head/neck left foot.  Nontender with toe motion left foot.  Prominence dorsal aspect 1st MTP left foot consistent with arthritic changes.    Radiography:  Personally reviewed x-rays of the left foot completed on 09/13/2023 which showed a healed nondisplaced 5th metatarsal neck fracture and arthritic changes of the 1st MTP joint with osteophytes.      Assessment:      1. Healed 5th metatarsal neck stress fracture, left foot   2.   3.  First MTP arthritis, left foot.    Resolved left foot pain      Plan:      1.  Discussed physical examination and radiographic findings with the patient. Mi understands that she has healed the 5th metatarsal stress fracture of her left foot and at this point can return to full activities.  2. Continue to use arch supports in his shoes.    3. Any minor pain can be treated with over-the-counter medication dosed per box instructions.    4. May return to full activities without restrictions.  5. Follow up p.r.n..

## 2023-10-12 RX ORDER — AMLODIPINE BESYLATE 5 MG/1
5 TABLET ORAL
Qty: 90 TABLET | Refills: 3 | Status: SHIPPED | OUTPATIENT
Start: 2023-10-12

## 2023-10-18 RX ORDER — ROSUVASTATIN CALCIUM 20 MG/1
20 TABLET, COATED ORAL
Qty: 90 TABLET | Refills: 3 | Status: SHIPPED | OUTPATIENT
Start: 2023-10-18

## 2024-02-01 DIAGNOSIS — I10 PRIMARY HYPERTENSION: ICD-10-CM

## 2024-02-06 DIAGNOSIS — Z12.11 COLON CANCER SCREENING: ICD-10-CM

## 2024-06-26 DIAGNOSIS — Z78.0 MENOPAUSE: ICD-10-CM

## 2024-07-09 ENCOUNTER — PATIENT MESSAGE (OUTPATIENT)
Dept: ADMINISTRATIVE | Facility: HOSPITAL | Age: 69
End: 2024-07-09
Payer: MEDICARE

## 2024-07-30 DIAGNOSIS — Z00.00 ENCOUNTER FOR MEDICARE ANNUAL WELLNESS EXAM: ICD-10-CM

## 2024-11-12 ENCOUNTER — PATIENT MESSAGE (OUTPATIENT)
Dept: ADMINISTRATIVE | Facility: HOSPITAL | Age: 69
End: 2024-11-12
Payer: MEDICARE

## 2024-11-18 ENCOUNTER — OFFICE VISIT (OUTPATIENT)
Dept: FAMILY MEDICINE | Facility: CLINIC | Age: 69
End: 2024-11-18
Payer: MEDICARE

## 2024-11-18 ENCOUNTER — LAB VISIT (OUTPATIENT)
Dept: LAB | Facility: HOSPITAL | Age: 69
End: 2024-11-18
Attending: FAMILY MEDICINE
Payer: MEDICARE

## 2024-11-18 VITALS
WEIGHT: 207.81 LBS | BODY MASS INDEX: 32.62 KG/M2 | HEIGHT: 67 IN | DIASTOLIC BLOOD PRESSURE: 82 MMHG | HEART RATE: 68 BPM | OXYGEN SATURATION: 98 % | SYSTOLIC BLOOD PRESSURE: 134 MMHG | RESPIRATION RATE: 18 BRPM

## 2024-11-18 DIAGNOSIS — Z12.31 SCREENING MAMMOGRAM FOR BREAST CANCER: ICD-10-CM

## 2024-11-18 DIAGNOSIS — R10.13 EPIGASTRIC ABDOMINAL PAIN: ICD-10-CM

## 2024-11-18 DIAGNOSIS — R10.11 RIGHT UPPER QUADRANT PAIN: ICD-10-CM

## 2024-11-18 DIAGNOSIS — Z85.820 HISTORY OF MELANOMA: ICD-10-CM

## 2024-11-18 DIAGNOSIS — Z85.828 HISTORY OF BASAL CELL CANCER: ICD-10-CM

## 2024-11-18 DIAGNOSIS — R10.13 EPIGASTRIC ABDOMINAL PAIN: Primary | ICD-10-CM

## 2024-11-18 LAB
ALBUMIN SERPL BCP-MCNC: 3.7 G/DL (ref 3.5–5.2)
ALP SERPL-CCNC: 84 U/L (ref 40–150)
ALT SERPL W/O P-5'-P-CCNC: 11 U/L (ref 10–44)
ANION GAP SERPL CALC-SCNC: 8 MMOL/L (ref 8–16)
AST SERPL-CCNC: 15 U/L (ref 10–40)
BILIRUB SERPL-MCNC: 0.5 MG/DL (ref 0.1–1)
BUN SERPL-MCNC: 15 MG/DL (ref 8–23)
CALCIUM SERPL-MCNC: 9.2 MG/DL (ref 8.7–10.5)
CHLORIDE SERPL-SCNC: 108 MMOL/L (ref 95–110)
CO2 SERPL-SCNC: 23 MMOL/L (ref 23–29)
CREAT SERPL-MCNC: 0.8 MG/DL (ref 0.5–1.4)
EST. GFR  (NO RACE VARIABLE): >60 ML/MIN/1.73 M^2
GLUCOSE SERPL-MCNC: 126 MG/DL (ref 70–110)
LIPASE SERPL-CCNC: 33 U/L (ref 4–60)
POTASSIUM SERPL-SCNC: 4 MMOL/L (ref 3.5–5.1)
PROT SERPL-MCNC: 6.8 G/DL (ref 6–8.4)
SODIUM SERPL-SCNC: 139 MMOL/L (ref 136–145)

## 2024-11-18 PROCEDURE — 80053 COMPREHEN METABOLIC PANEL: CPT | Performed by: FAMILY MEDICINE

## 2024-11-18 PROCEDURE — 36415 COLL VENOUS BLD VENIPUNCTURE: CPT | Performed by: FAMILY MEDICINE

## 2024-11-18 PROCEDURE — 99214 OFFICE O/P EST MOD 30 MIN: CPT | Mod: S$PBB,,, | Performed by: FAMILY MEDICINE

## 2024-11-18 PROCEDURE — 99214 OFFICE O/P EST MOD 30 MIN: CPT | Mod: PBBFAC,PN | Performed by: FAMILY MEDICINE

## 2024-11-18 PROCEDURE — 83690 ASSAY OF LIPASE: CPT | Performed by: FAMILY MEDICINE

## 2024-11-18 PROCEDURE — 99999 PR PBB SHADOW E&M-EST. PATIENT-LVL IV: CPT | Mod: PBBFAC,,, | Performed by: FAMILY MEDICINE

## 2024-11-18 NOTE — PROGRESS NOTES
Subjective:       Patient ID: Mi Hancock is a 69 y.o. female.    Chief Complaint: Abdominal Pain (In the center of abd with bloating all lasting about two weeks. )    Presents today to follow up.    She reports overall doing well.     She needs a referral to dermatology. Has a history of melanoma and basal cell.     Lastly, she also has had some stomach pain in the center of her abdomen with some bloating.   She last had an EGD 6 years ago.   Not sure if it is her gallbladder or stomach. A couple of times sharp shooting pains.         .  Patient Active Problem List   Diagnosis    Seasonal allergic rhinitis due to pollen    Nasal turbinate hypertrophy    Deviated septum    Chronic pansinusitis    Hearing loss    Sensation of fullness in right ear    Chronic maxillary sinusitis    Episodic recurrent vertigo    Primary hypertension    Precordial pain    Gait abnormality     Mi currently has no medications in their medication list.    Review of Systems   Constitutional:  Negative for activity change, appetite change, fatigue and fever.   Respiratory:  Negative for shortness of breath.    Gastrointestinal:  Negative for abdominal pain.   Integumentary:  Negative for rash.         Health Maintenance Due   Topic Date Due    Hepatitis C Screening  Never done    TETANUS VACCINE  Never done    Colorectal Cancer Screening  Never done    Shingles Vaccine (1 of 2) Never done    RSV Vaccine (Age 60+ and Pregnant patients) (1 - Risk 60-74 years 1-dose series) Never done    Pneumococcal Vaccines (Age 65+) (1 of 1 - PCV) Never done    DEXA Scan  05/13/2024    Mammogram  06/13/2024    COVID-19 Vaccine (3 - 2024-25 season) 09/01/2024      Health Maintenance reviewed and discussed- Nothing additional desired today.   Objective:      Physical Exam  Vitals and nursing note reviewed.   Constitutional:       General: She is not in acute distress.     Appearance: She is not ill-appearing.   Cardiovascular:      Rate and Rhythm: Regular  rhythm.      Heart sounds: No murmur heard.  Pulmonary:      Effort: Pulmonary effort is normal.      Breath sounds: Normal breath sounds. No wheezing.   Skin:     General: Skin is warm and dry.      Findings: No rash.   Neurological:      Mental Status: She is alert.   Psychiatric:         Mood and Affect: Mood normal.         Behavior: Behavior normal.         Assessment:       1. Epigastric abdominal pain    2. History of basal cell cancer    3. History of melanoma    4. Screening mammogram for breast cancer    5. Right upper quadrant pain        Plan:       1. Epigastric abdominal pain  -     US Abdomen Limited; Future; Expected date: 11/18/2024  -     Comprehensive metabolic panel; Future; Expected date: 11/18/2024  -     Lipase; Future; Expected date: 11/18/2024    2. History of basal cell cancer  -     Ambulatory referral/consult to Dermatology; Future; Expected date: 11/25/2024    3. History of melanoma  -     Ambulatory referral/consult to Dermatology; Future; Expected date: 11/25/2024    4. Screening mammogram for breast cancer  -     Mammo Digital Screening Bilat w/ De; Future; Expected date: 11/18/2024    5. Right upper quadrant pain  -     US Abdomen Limited; Future; Expected date: 11/18/2024  -     Comprehensive metabolic panel; Future; Expected date: 11/18/2024  -     Lipase; Future; Expected date: 11/18/2024

## 2024-11-18 NOTE — PATIENT INSTRUCTIONS
[facility]  [phone]     Thank you for allowing me to participate in your care today. It is an honor to be a part of your healthcare team at [facility]. If you had labs ordered today, you will receive notification via Easy Bill Online, phone call or mailed letter regarding your results within 7 days. If you have any questions or concerns regarding your visit today, please do not hesitate to contact us.  Sincerely,   Yesica Mcfarlane M.D.

## 2024-11-21 ENCOUNTER — HOSPITAL ENCOUNTER (OUTPATIENT)
Dept: RADIOLOGY | Facility: HOSPITAL | Age: 69
Discharge: HOME | End: 2024-11-21
Attending: FAMILY MEDICINE
Payer: MEDICARE

## 2024-11-21 DIAGNOSIS — R10.11 RIGHT UPPER QUADRANT PAIN: ICD-10-CM

## 2024-11-21 DIAGNOSIS — R10.13 EPIGASTRIC ABDOMINAL PAIN: ICD-10-CM

## 2024-11-21 PROCEDURE — 76705 ECHO EXAM OF ABDOMEN: CPT | Mod: 26,,, | Performed by: RADIOLOGY

## 2024-11-21 PROCEDURE — 76705 ECHO EXAM OF ABDOMEN: CPT | Mod: TC

## 2024-11-22 ENCOUNTER — PATIENT MESSAGE (OUTPATIENT)
Dept: FAMILY MEDICINE | Facility: CLINIC | Age: 69
End: 2024-11-22
Payer: MEDICARE

## 2024-11-22 DIAGNOSIS — K80.20 GALLSTONES: Primary | ICD-10-CM

## 2024-11-29 ENCOUNTER — OFFICE VISIT (OUTPATIENT)
Dept: SURGERY | Facility: CLINIC | Age: 69
End: 2024-11-29
Payer: MEDICARE

## 2024-11-29 VITALS
SYSTOLIC BLOOD PRESSURE: 190 MMHG | DIASTOLIC BLOOD PRESSURE: 89 MMHG | OXYGEN SATURATION: 98 % | HEIGHT: 67 IN | HEART RATE: 78 BPM | WEIGHT: 209 LBS | BODY MASS INDEX: 32.8 KG/M2

## 2024-11-29 DIAGNOSIS — K80.20 GALLSTONES: Primary | ICD-10-CM

## 2024-11-29 PROCEDURE — 99215 OFFICE O/P EST HI 40 MIN: CPT | Mod: PBBFAC | Performed by: SPECIALIST

## 2024-11-29 PROCEDURE — 99203 OFFICE O/P NEW LOW 30 MIN: CPT | Mod: S$PBB,,, | Performed by: SPECIALIST

## 2024-11-29 PROCEDURE — 99999 PR PBB SHADOW E&M-EST. PATIENT-LVL V: CPT | Mod: PBBFAC,,, | Performed by: SPECIALIST

## 2024-11-29 RX ORDER — SODIUM CHLORIDE 9 MG/ML
INJECTION, SOLUTION INTRAVENOUS CONTINUOUS
Status: CANCELLED | OUTPATIENT
Start: 2024-11-29

## 2024-11-29 NOTE — PATIENT INSTRUCTIONS
Pre-operative Instructions     Date of procedure:  12/16/24    Do not take the following Medications for 3 days prior to your procedure:   Aspirin, Excedrin, BC powder or goodies powders   Ibuprofen or Motrin  Naproxen or Aleve  Fish oils  Vitamin E    Pre-OP Instructions  On the night of 12/15/24 Scrub the surgical area with Hibiclens for several minutes  On the morning of 12/16/24 scrub the surgical area with Hibiclens for several minutes  Do not shave or clip hair at the site of your surgery  Do not wear jewelry to the hospital.  You will have to remove it.  Leave at home so that it is not lost.    Food/Drink   Do not eat or drink after Midnight     Location of Department:   [facility] [address], [place] [address]    Parking:    Use parking lot 1  Enter at the main hospital entrance  Check in with outpatient registration    Contact:   Someone from surgery will call you with a time of arrival.   If you surgery is on Monday, someone will contact you on Friday.  If you do not receive a call from surgery by 2pm the business day (12/13/24) before your procedure, please call [phone].     Medications:   You may take your blood pressure/thyroid/seizure medications with a small sip of water 2 hours prior to procedure. Take all other morning medications after the procedure        Transportation:   You will NOT be able to drive yourself.  You are required to have someone drive you home from the hospital due to the anesthesia.              Post-operative Instructions      RESTRICTIONS:   During your procedure today, you received medications for sedation.     These medications may affect your judgement, balance, and coordination.     DO NOT drive a car, operate machinery, make legal/financial decisions, sign important papers or drink alcohol on day of procedure.     ACTIVITY:   After your surgery you CANNOT LIFT anything over 10 pounds, no pushing, no pulling, no bending or no  strenuous exercise UNTIL released by doctor.  Do not bath until your sutures or staples are removed.  You may take a shower. Keep surgical areas clean and dry, re-bandage areas as needed.     DIET AND MEDICATIONS    May eat and drink normally unless instructed otherwise.     Continue present medications unless otherwise instructed     TREATMENT FOR COMMON SIDE EFFECTS:   Pain medication is prescribed to be taken as needed, NOT on a schedule. Use pain medication for periods of high activity and before sleep. Providers have limits on amounts of medications that they may prescribe. Use pain medication properly to assure availability.     Because air was used during your procedure you may experience mild abdominal pain, nausea, belching, bloating or excessive gas: walking, rest, eat lightly and use a heating pad to help alleviate discomfort.     Sore throat: treat with throat lozenges and/or gargle with warm salt water.     If a bowel prep was taken, you may not have a bowel movement for 1-3 days. This is normal.     IF YOU HAVE ANY OF THE SYMPTOMS BELOW, REPORT TO YOUR PHYSICIAN:     1. Excessive or unexpected pain in back or abdomen     2. Signs of infection such as: chills or fever occurring within 24 hours after the procedure.     3. Rectal bleeding, which would show as bright red, maroon, or black stools. (A tablespoon of blood from the rectum is not serious, especially if hemorrhoids are present.)     4. Vomiting     5. Weakness or dizziness.     IF YOU EXPERIENCE ANY OF THE FOLLOWING, GO DIRECTLY TO THE NEAREST EMERGENCY ROOM:  1. Difficulty breathing     2. Chills and/or fever over 101 F     3. Persistent vomiting and/or vomiting blood     4. Severe abdominal pain     5. Chest pain     6. Black, tarry stools     7. Bleeding-more than one tablespoon     8. Any other symptoms or condition that you feel may need urgent attention.       YOUR DOCTOR RECOMMENDS THESE ADDITIONAL INSTRUCTIONS:     1. If any biopsies  were taken, your results will be discussed at your follow up appointment     2. Further recommendations will depend on how you are recovering from you procedure

## 2024-11-29 NOTE — PROGRESS NOTES
Subjective     Patient ID: Mi Hancock  is a 69 y.o. female     Chief Complaint: No chief complaint on file.     There were no vitals filed for this visit.    Patient is a very pleasant 69-year-old female referred in evaluation for symptomatic gallbladder disease.  Ultrasound shows stones.  Postprandial epigastric and right upper quadrant abdominal discomfort crampy in nature sharp about 30-40 minutes after eating.  Woke her up at middle of the night when the 1 time.    Past Medical History:   Diagnosis Date    Allergy     Angio-edema     Melanoma     Thyroid nodule      Past Surgical History:   Procedure Laterality Date    APPENDECTOMY      HYSTERECTOMY      melanoma Left     flank     Family History   Problem Relation Name Age of Onset    Osteoarthritis Mother Mady     Arthritis Mother Mady     Heart disease Father Delos     Breast cancer Maternal Aunt      Allergies Brother      Allergic rhinitis Neg Hx      Angioedema Neg Hx      Asthma Neg Hx      Atopy Neg Hx      Eczema Neg Hx      Immunodeficiency Neg Hx      Rhinitis Neg Hx      Urticaria Neg Hx       Past Surgical History:   Procedure Laterality Date    APPENDECTOMY      HYSTERECTOMY      melanoma Left     flank     Social History     Socioeconomic History    Marital status:    Tobacco Use    Smoking status: Former     Current packs/day: 0.00     Average packs/day: 1 pack/day for 10.0 years (10.0 ttl pk-yrs)     Types: Cigarettes     Start date: 1981     Quit date: 1991     Years since quittin.9    Smokeless tobacco: Never   Substance and Sexual Activity    Alcohol use: Yes     Alcohol/week: 2.0 standard drinks of alcohol     Types: 2 Glasses of wine per week    Drug use: Never    Sexual activity: Not Currently     Partners: Male     Comment: Was on Birth Control Pills     Social Drivers of Health     Financial Resource Strain: Low Risk  (2024)    Overall Financial Resource Strain (CARDIA)     Difficulty of Paying Living  Expenses: Not hard at all   Food Insecurity: No Food Insecurity (11/18/2024)    Hunger Vital Sign     Worried About Running Out of Food in the Last Year: Never true     Ran Out of Food in the Last Year: Never true   Transportation Needs: No Transportation Needs (9/3/2019)    Received from [facility] and Its Subsidiaries and Affiliates    PRAPARE - Transportation     Lack of Transportation (Medical): No     Lack of Transportation (Non-Medical): No   Physical Activity: Patient Declined (11/18/2024)    Exercise Vital Sign     Days of Exercise per Week: Patient declined     Minutes of Exercise per Session: Patient declined   Stress: No Stress Concern Present (11/18/2024)    [employer] - Occupational Stress Questionnaire     Feeling of Stress : Only a little   Housing Stability: Unknown (11/18/2024)    Housing Stability Vital Sign     Unable to Pay for Housing in the Last Year: No      No current outpatient medications on file.   Review of patient's allergies indicates:  No Known Allergies         Review of Systems   Gastrointestinal:  Positive for abdominal pain.        Epigastric and right upper quadrant pain intermittently.  Crampy in nature        I have reviewed the following:     Details / Date    []   Labs     []   Micro     []   Pathology     [x]   Imaging Ultrasound images reviewed as well as report    []   Cardiology Procedures     [x]   Other Referral note reviewed        Objective         Physical Exam  Vitals and nursing note reviewed. Exam conducted with a chaperone present.   Constitutional:       Appearance: Normal appearance. She is normal weight.   HENT:      Head: Normocephalic and atraumatic.      Mouth/Throat:      Mouth: Mucous membranes are moist.   Eyes:      Extraocular Movements: Extraocular movements intact.      Conjunctiva/sclera: Conjunctivae normal.      Pupils: Pupils are equal, round, and reactive to light.    Cardiovascular:      Rate and Rhythm: Normal rate.   Pulmonary:      Effort: Pulmonary effort is normal.   Abdominal:      General: Abdomen is flat.      Palpations: Abdomen is soft.          Comments: Get an appointment abdominal discomfort to deep palpation   Musculoskeletal:         General: Normal range of motion.      Cervical back: Normal range of motion and neck supple.   Skin:     General: Skin is warm.   Neurological:      General: No focal deficit present.      Mental Status: She is alert and oriented to person, place, and time. Mental status is at baseline.   Psychiatric:         Mood and Affect: Mood normal.          Assessment and Plan     1. Gallstones  -     Ambulatory referral/consult to General Surgery  -     Vital signs; Standing  -     Insert peripheral IV; Standing  -     Diet NPO; Standing  -     Case Request Operating Room: CHOLECYSTECTOMY, LAPAROSCOPIC, WITH CHOLANGIOGRAM  -     Full code; Standing  -     Place in Outpatient; Standing  -     Place HERO hose; Standing    Other orders  -     0.9% NaCl infusion  -     IP VTE LOW RISK PATIENT; Standing  -     ceFAZolin 2 g in D5W 50 mL IVPB (MB+)     Symptomatic cholelithiasis.  Recommend laparoscopic cholecystectomy.  Risks and benefits have been thoroughly explained to the patient.  She agrees to laparoscopic cholecystectomy.  Orders Placed This Encounter   Procedures    Insert peripheral IV        An After Visit Summary was printed and given to the patient.       Sy Thompson MD  [facility] 2nd Floor General Surgery  [address] [facility] [place] [address]  [phone]     This note was created using [employer] direct voice recognition software. Note may have occasional typographical errors that may not have been identified and edited despite initial review prior to signing.     Patient instructed that best way to communicate with my office staff is for patient to get on the [facility] patient portal to expedite communication and  communication issues that may occur.  Patient was given instructions on how to get on the portal.  I encouraged patient to obtain portal access as well.  Ultimately it is up to the patient to obtain access.  Patient voiced understanding.

## 2024-12-03 ENCOUNTER — ANESTHESIA EVENT (OUTPATIENT)
Dept: SURGERY | Facility: HOSPITAL | Age: 69
End: 2024-12-03
Payer: MEDICARE

## 2024-12-03 NOTE — ANESTHESIA PREPROCEDURE EVALUATION
12/03/2024  Mi Hancock is a 69 y.o., female.   has a past surgical history that includes Hysterectomy; melanoma (Left); and Appendectomy.       Pre-op Assessment    I have reviewed the Patient Summary Reports.     I have reviewed the Nursing Notes.    I have reviewed the Medications.     Review of Systems  Anesthesia Hx:  No problems with previous Anesthesia                Social:  Former Smoker       Hematology/Oncology:  Hematology Normal   Oncology Normal                                   EENT/Dental:  chronic allergic rhinitis           Cardiovascular:  Exercise tolerance: good   Hypertension              ECG has been reviewed. 1/23 ECG- Normal sinus rhythm   Left axis deviation   Cannot rule out Anterior infarct ,age undetermined     1/23 Stress test (SPECT)-  Normal myocardial perfusion scan. There is no evidence of myocardial ischemia or infarction.    1/23 ECHO- EF 68%                             Pulmonary:  Pulmonary Normal                       Renal/:  Renal/ Normal                 Hepatic/GI:        gallstones             Neurological:           Hearing loss                            Endocrine:        Obesity / BMI > 30  Psych:  Psychiatric Normal                  Physical Exam  General: Well nourished    Airway:  Mallampati: IV   Mouth Opening: Small, but > 3cm  TM Distance: 4 - 6 cm  Tongue: Large  Neck ROM: Normal ROM    Dental:  Intact        Anesthesia Plan  Type of Anesthesia, risks & benefits discussed:    Anesthesia Type: Gen ETT  Intra-op Monitoring Plan: Standard ASA Monitors  Post Op Pain Control Plan: multimodal analgesia  Induction:  IV  Airway Plan: Video, Post-Induction  Informed Consent: Informed consent signed with the Patient and all parties understand the risks and agree with anesthesia plan.  All questions answered. Patient consented to blood products? Yes  ASA Score:  3  Day of Surgery Review of History & Physical: H&P Update referred to the surgeon/provider.    Ready For Surgery From Anesthesia Perspective.     .

## 2024-12-13 ENCOUNTER — HOSPITAL ENCOUNTER (OUTPATIENT)
Dept: PREADMISSION TESTING | Facility: HOSPITAL | Age: 69
Discharge: HOME | End: 2024-12-13
Payer: MEDICARE

## 2024-12-13 VITALS
WEIGHT: 200 LBS | OXYGEN SATURATION: 98 % | DIASTOLIC BLOOD PRESSURE: 79 MMHG | RESPIRATION RATE: 16 BRPM | HEART RATE: 78 BPM | SYSTOLIC BLOOD PRESSURE: 148 MMHG | BODY MASS INDEX: 31.39 KG/M2 | TEMPERATURE: 98 F | HEIGHT: 67 IN

## 2024-12-13 PROCEDURE — 99900103 DSU ONLY-NO CHARGE-INITIAL HR (STAT)

## 2024-12-13 RX ORDER — AMLODIPINE BESYLATE 5 MG/1
5 TABLET ORAL DAILY
COMMUNITY

## 2024-12-16 ENCOUNTER — HOSPITAL ENCOUNTER (OUTPATIENT)
Facility: HOSPITAL | Age: 69
Discharge: HOME | End: 2024-12-16
Attending: SPECIALIST | Admitting: SPECIALIST
Payer: MEDICARE

## 2024-12-16 ENCOUNTER — ANESTHESIA (OUTPATIENT)
Dept: SURGERY | Facility: HOSPITAL | Age: 69
End: 2024-12-16
Payer: MEDICARE

## 2024-12-16 DIAGNOSIS — K80.20 SYMPTOMATIC CHOLELITHIASIS: Primary | ICD-10-CM

## 2024-12-16 DIAGNOSIS — K80.20 GALLSTONES: ICD-10-CM

## 2024-12-16 PROCEDURE — 63600175 PHARM REV CODE 636 W HCPCS: Performed by: SPECIALIST

## 2024-12-16 PROCEDURE — 36000709 HC OR TIME LEV III EA ADD 15 MIN: Performed by: SPECIALIST

## 2024-12-16 PROCEDURE — 25000003 PHARM REV CODE 250: Performed by: NURSE ANESTHETIST, CERTIFIED REGISTERED

## 2024-12-16 PROCEDURE — 71000015 HC POSTOP RECOV 1ST HR: Performed by: SPECIALIST

## 2024-12-16 PROCEDURE — 47563 LAPARO CHOLECYSTECTOMY/GRAPH: CPT | Mod: ,,, | Performed by: SPECIALIST

## 2024-12-16 PROCEDURE — 25000003 PHARM REV CODE 250: Performed by: ANESTHESIOLOGY

## 2024-12-16 PROCEDURE — 88304 TISSUE EXAM BY PATHOLOGIST: CPT | Performed by: STUDENT IN AN ORGANIZED HEALTH CARE EDUCATION/TRAINING PROGRAM

## 2024-12-16 PROCEDURE — 36000708 HC OR TIME LEV III 1ST 15 MIN: Performed by: SPECIALIST

## 2024-12-16 PROCEDURE — D9220A PRA ANESTHESIA: Mod: ANES,,, | Performed by: ANESTHESIOLOGY

## 2024-12-16 PROCEDURE — 63600175 PHARM REV CODE 636 W HCPCS: Performed by: ANESTHESIOLOGY

## 2024-12-16 PROCEDURE — 25500020 PHARM REV CODE 255: Performed by: SPECIALIST

## 2024-12-16 PROCEDURE — 27201423 OPTIME MED/SURG SUP & DEVICES STERILE SUPPLY: Performed by: SPECIALIST

## 2024-12-16 PROCEDURE — 71000016 HC POSTOP RECOV ADDL HR: Performed by: SPECIALIST

## 2024-12-16 PROCEDURE — 63600175 PHARM REV CODE 636 W HCPCS: Performed by: NURSE ANESTHETIST, CERTIFIED REGISTERED

## 2024-12-16 PROCEDURE — 63600175 PHARM REV CODE 636 W HCPCS: Mod: JZ,JG | Performed by: NURSE ANESTHETIST, CERTIFIED REGISTERED

## 2024-12-16 PROCEDURE — 37000008 HC ANESTHESIA 1ST 15 MINUTES: Performed by: SPECIALIST

## 2024-12-16 PROCEDURE — 37000009 HC ANESTHESIA EA ADD 15 MINS: Performed by: SPECIALIST

## 2024-12-16 PROCEDURE — 71000033 HC RECOVERY, INTIAL HOUR: Performed by: SPECIALIST

## 2024-12-16 PROCEDURE — D9220A PRA ANESTHESIA: Mod: CRNA,,, | Performed by: NURSE ANESTHETIST, CERTIFIED REGISTERED

## 2024-12-16 RX ORDER — PROPOFOL 10 MG/ML
VIAL (ML) INTRAVENOUS
Status: DISCONTINUED | OUTPATIENT
Start: 2024-12-16 | End: 2024-12-16

## 2024-12-16 RX ORDER — SODIUM CHLORIDE 9 MG/ML
INJECTION, SOLUTION INTRAVENOUS CONTINUOUS
Status: DISCONTINUED | OUTPATIENT
Start: 2024-12-16 | End: 2024-12-16 | Stop reason: HOSPADM

## 2024-12-16 RX ORDER — FENTANYL CITRATE 50 UG/ML
INJECTION, SOLUTION INTRAMUSCULAR; INTRAVENOUS
Status: DISCONTINUED | OUTPATIENT
Start: 2024-12-16 | End: 2024-12-16

## 2024-12-16 RX ORDER — SODIUM CHLORIDE 0.9 % (FLUSH) 0.9 %
10 SYRINGE (ML) INJECTION
Status: DISCONTINUED | OUTPATIENT
Start: 2024-12-16 | End: 2024-12-16 | Stop reason: HOSPADM

## 2024-12-16 RX ORDER — OXYCODONE AND ACETAMINOPHEN 5; 325 MG/1; MG/1
1 TABLET ORAL EVERY 6 HOURS PRN
Qty: 10 TABLET | Refills: 0 | Status: SHIPPED | OUTPATIENT
Start: 2024-12-16

## 2024-12-16 RX ORDER — CEFAZOLIN 2 G/1
2 INJECTION, POWDER, FOR SOLUTION INTRAMUSCULAR; INTRAVENOUS
Status: COMPLETED | OUTPATIENT
Start: 2024-12-16 | End: 2024-12-16

## 2024-12-16 RX ORDER — ONDANSETRON HYDROCHLORIDE 2 MG/ML
INJECTION, SOLUTION INTRAVENOUS
Status: DISCONTINUED | OUTPATIENT
Start: 2024-12-16 | End: 2024-12-16

## 2024-12-16 RX ORDER — DEXAMETHASONE SODIUM PHOSPHATE 4 MG/ML
INJECTION, SOLUTION INTRA-ARTICULAR; INTRALESIONAL; INTRAMUSCULAR; INTRAVENOUS; SOFT TISSUE
Status: DISCONTINUED | OUTPATIENT
Start: 2024-12-16 | End: 2024-12-16

## 2024-12-16 RX ORDER — LIDOCAINE HYDROCHLORIDE 20 MG/ML
INJECTION, SOLUTION EPIDURAL; INFILTRATION; INTRACAUDAL; PERINEURAL
Status: DISCONTINUED | OUTPATIENT
Start: 2024-12-16 | End: 2024-12-16

## 2024-12-16 RX ORDER — HYDROMORPHONE HYDROCHLORIDE 2 MG/ML
0.2 INJECTION, SOLUTION INTRAMUSCULAR; INTRAVENOUS; SUBCUTANEOUS EVERY 5 MIN PRN
Status: DISCONTINUED | OUTPATIENT
Start: 2024-12-16 | End: 2024-12-16 | Stop reason: HOSPADM

## 2024-12-16 RX ORDER — GLUCAGON 1 MG
1 KIT INJECTION
Status: DISCONTINUED | OUTPATIENT
Start: 2024-12-16 | End: 2024-12-16 | Stop reason: HOSPADM

## 2024-12-16 RX ORDER — SODIUM CHLORIDE, SODIUM LACTATE, POTASSIUM CHLORIDE, CALCIUM CHLORIDE 600; 310; 30; 20 MG/100ML; MG/100ML; MG/100ML; MG/100ML
INJECTION, SOLUTION INTRAVENOUS CONTINUOUS
Status: DISCONTINUED | OUTPATIENT
Start: 2024-12-16 | End: 2024-12-16 | Stop reason: HOSPADM

## 2024-12-16 RX ORDER — ACETAMINOPHEN 10 MG/ML
INJECTION, SOLUTION INTRAVENOUS
Status: DISCONTINUED | OUTPATIENT
Start: 2024-12-16 | End: 2024-12-16

## 2024-12-16 RX ORDER — ROCURONIUM BROMIDE 10 MG/ML
INJECTION, SOLUTION INTRAVENOUS
Status: DISCONTINUED | OUTPATIENT
Start: 2024-12-16 | End: 2024-12-16

## 2024-12-16 RX ORDER — MIDAZOLAM HYDROCHLORIDE 1 MG/ML
INJECTION INTRAMUSCULAR; INTRAVENOUS
Status: DISCONTINUED | OUTPATIENT
Start: 2024-12-16 | End: 2024-12-16

## 2024-12-16 RX ORDER — ONDANSETRON HYDROCHLORIDE 2 MG/ML
4 INJECTION, SOLUTION INTRAVENOUS DAILY PRN
Status: DISCONTINUED | OUTPATIENT
Start: 2024-12-16 | End: 2024-12-16 | Stop reason: HOSPADM

## 2024-12-16 RX ORDER — LIDOCAINE HYDROCHLORIDE 10 MG/ML
1 INJECTION, SOLUTION EPIDURAL; INFILTRATION; INTRACAUDAL; PERINEURAL ONCE
Status: DISCONTINUED | OUTPATIENT
Start: 2024-12-16 | End: 2024-12-16 | Stop reason: HOSPADM

## 2024-12-16 RX ADMIN — PROPOFOL 200 MG: 10 INJECTION, EMULSION INTRAVENOUS at 08:12

## 2024-12-16 RX ADMIN — SUGAMMADEX 200 MG: 100 INJECTION, SOLUTION INTRAVENOUS at 09:12

## 2024-12-16 RX ADMIN — MIDAZOLAM HYDROCHLORIDE 2 MG: 1 INJECTION, SOLUTION INTRAMUSCULAR; INTRAVENOUS at 08:12

## 2024-12-16 RX ADMIN — PROMETHAZINE HYDROCHLORIDE 6.25 MG: 25 INJECTION INTRAMUSCULAR; INTRAVENOUS at 11:12

## 2024-12-16 RX ADMIN — CEFAZOLIN 2 G: 2 INJECTION, POWDER, FOR SOLUTION INTRAMUSCULAR; INTRAVENOUS at 08:12

## 2024-12-16 RX ADMIN — FENTANYL CITRATE 100 MCG: 50 INJECTION, SOLUTION INTRAMUSCULAR; INTRAVENOUS at 08:12

## 2024-12-16 RX ADMIN — ONDANSETRON 4 MG: 2 INJECTION INTRAMUSCULAR; INTRAVENOUS at 10:12

## 2024-12-16 RX ADMIN — HYDROMORPHONE HYDROCHLORIDE 0.2 MG: 2 INJECTION INTRAMUSCULAR; INTRAVENOUS; SUBCUTANEOUS at 10:12

## 2024-12-16 RX ADMIN — ROCURONIUM BROMIDE 50 MG: 10 INJECTION, SOLUTION INTRAVENOUS at 08:12

## 2024-12-16 RX ADMIN — SODIUM CHLORIDE, POTASSIUM CHLORIDE, SODIUM LACTATE AND CALCIUM CHLORIDE: 600; 310; 30; 20 INJECTION, SOLUTION INTRAVENOUS at 08:12

## 2024-12-16 RX ADMIN — ACETAMINOPHEN 1000 MG: 10 INJECTION INTRAVENOUS at 09:12

## 2024-12-16 RX ADMIN — ONDANSETRON 4 MG: 2 INJECTION INTRAMUSCULAR; INTRAVENOUS at 09:12

## 2024-12-16 RX ADMIN — ROCURONIUM BROMIDE 10 MG: 10 INJECTION, SOLUTION INTRAVENOUS at 09:12

## 2024-12-16 RX ADMIN — DEXAMETHASONE SODIUM PHOSPHATE 4 MG: 4 INJECTION, SOLUTION INTRA-ARTICULAR; INTRALESIONAL; INTRAMUSCULAR; INTRAVENOUS; SOFT TISSUE at 08:12

## 2024-12-16 RX ADMIN — LIDOCAINE HYDROCHLORIDE 50 MG: 20 INJECTION, SOLUTION EPIDURAL; INFILTRATION; INTRACAUDAL; PERINEURAL at 08:12

## 2024-12-16 NOTE — PLAN OF CARE
Remains drowsy. Eyes open to voice. Nausea improved. States she does not feel like she is ready to go home right now and needs a few more minutes.

## 2024-12-16 NOTE — DISCHARGE INSTRUCTIONS
[facility]   [phone]  [facility]      [phone]    Post-op instructions:  Do not lift anything over 10 pounds. No pushing, no pulling, no bending or no strenuous exercise until released by doctor.    You may remove the dressings in 48 hours.  You may shower once the dressings are removed.  Do not submerge in any water, take a tub bath or go swimming etc. until released by Dr. Thompson.   Keep surgical areas clean and dry. You may re-bandage areas as needed.  Wear the abdominal binder as instructed.      DERMABOND ADVANCED   Adhesive is a sterile, liquid skin adhesive that holds wound edges together. The film will usually remain in place for 5 to 10 days, then naturally fall of your skin. The following will answer some of your questions and provide instructions for proper care for your wound while it is healing.    Check Wound Appearance   Some swelling, redness, and pain are common with all wounds and normally will go away as the wound heals. If swelling, redness, or pain increases, or if the wound feels warm to the touch, contact a doctor. Also contact a doctor if the wound edges reopen or separate.    Caring for Bandages   Do not place tape directly over the DERMABOND adhesive, because removing the tape later may also remove the film.    Avoid Topical Medications   Do not apply liquid or ointment medications or any other product to your wound while the DERMABOND Adhesive is in place. These may loosen the film before your wound is healed.    Keep Wound Dry and Protected   Apply a clean, dry bandage over the wound if necessary to protect it.   You may occasionally and briely wet your wound in the shower or bath. Do not soak or scrub your wound, do not swim, and avoid periods of heavy perspiration until the DERMABOND Adhesive has naturally fallen off.   After showering or bathing, gently blot your wound dry with a soft towel. If a protective dressing is being used, apply a  fresh, dry bandage, keeping the tape off the DERMABOND Adhesive.   Protect your wound from injury until the skin has had sufficient time to heal.   Do not scratch, rub, or pick at the DERMABOND Adhesive. This may loosen the film before your wound is healed.   Protect the wound from prolonged exposure to sunlight or tanning lamps while the film is in place.

## 2024-12-16 NOTE — PLAN OF CARE
Discharge criteria met. Escorted via wheelchair to personal vehicle in stable condition. Ice pack, I.S., and emesis basin sent home with pt. Reports voiding without difficulty. Denies c/o nausea or any other bothersome symptoms.

## 2024-12-16 NOTE — PLAN OF CARE
Discharge instructions reviewed with pt and . V/u. All questions answered. Copy of discharge paperwork provided. Ready for discharge.

## 2024-12-16 NOTE — PLAN OF CARE
Incentive Spirometer provided. Educated pt on directions, frequency (ten times Q1 hour while awake) and benefits from using the I.S. Verbal and written instructions provided. Tolerated 1250 ml without difficulty.

## 2024-12-16 NOTE — PLAN OF CARE
"Reports nausea, no emesis. " I feel it starting to creep up again." Notified Dr. Gautam of pts symptoms. New order received for Phenergan 6.25 mg IVPB x1. May report dose in 10 minutes if 1st dose not effective.   "

## 2024-12-16 NOTE — H&P
Subjective      Patient ID: Mi Hancock  is a 69 y.o. female      Chief Complaint: No chief complaint on file.     Vitals   There were no vitals filed for this visit.        Patient is a very pleasant 69-year-old female referred in evaluation for symptomatic gallbladder disease.  Ultrasound shows stones.  Postprandial epigastric and right upper quadrant abdominal discomfort crampy in nature sharp about 30-40 minutes after eating.  Woke her up at middle of the night when the 1 time.          Past Medical History:   Diagnosis Date    Allergy      Angio-edema      Melanoma      Thyroid nodule              Past Surgical History:   Procedure Laterality Date    APPENDECTOMY        HYSTERECTOMY        melanoma Left       flank             Family History   Problem Relation Name Age of Onset    Osteoarthritis Mother Mady      Arthritis Mother Mady      Heart disease Father Delos      Breast cancer Maternal Aunt        Allergies Brother        Allergic rhinitis Neg Hx        Angioedema Neg Hx        Asthma Neg Hx        Atopy Neg Hx        Eczema Neg Hx        Immunodeficiency Neg Hx        Rhinitis Neg Hx        Urticaria Neg Hx                Past Surgical History:   Procedure Laterality Date    APPENDECTOMY        HYSTERECTOMY        melanoma Left       flank      Social History            Socioeconomic History    Marital status:    Tobacco Use    Smoking status: Former       Current packs/day: 0.00       Average packs/day: 1 pack/day for 10.0 years (10.0 ttl pk-yrs)       Types: Cigarettes       Start date: 1981       Quit date: 1991       Years since quittin.9    Smokeless tobacco: Never   Substance and Sexual Activity    Alcohol use: Yes       Alcohol/week: 2.0 standard drinks of alcohol       Types: 2 Glasses of wine per week    Drug use: Never    Sexual activity: Not Currently       Partners: Male       Comment: Was on Birth Control Pills      Social Drivers of Health           Financial  Resource Strain: Low Risk  (11/18/2024)     Overall Financial Resource Strain (CARDIA)      Difficulty of Paying Living Expenses: Not hard at all   Food Insecurity: No Food Insecurity (11/18/2024)     Hunger Vital Sign      Worried About Running Out of Food in the Last Year: Never true      Ran Out of Food in the Last Year: Never true   Transportation Needs: No Transportation Needs (9/3/2019)     [facility]     [facility]      Lack of Transportation (Medical): No      Lack of Transportation (Non-Medical): No   Physical Activity: Patient Declined (11/18/2024)     Exercise Vital Sign      Days of Exercise per Week: Patient declined      Minutes of Exercise per Session: Patient declined   Stress: No Stress Concern Present (11/18/2024)     [employer] - Occupational Stress Questionnaire      Feeling of Stress : Only a little   Housing Stability: Unknown (11/18/2024)     Housing Stability Vital Sign      Unable to Pay for Housing in the Last Year: No      Current Medications   No current outpatient medications on file.      Review of patient's allergies indicates:  No Known Allergies            Review of Systems   Gastrointestinal:  Positive for abdominal pain.        Epigastric and right upper quadrant pain intermittently.  Crampy in nature          I have reviewed the following:       Details / Date    []   Labs      []   Micro      []   Pathology      [x]   Imaging Ultrasound images reviewed as well as report    []   Cardiology Procedures      [x]   Other Referral note reviewed         Objective            Physical Exam  Vitals and nursing note reviewed. Exam conducted with a chaperone present.   Constitutional:       Appearance: Normal appearance. She is normal weight.   HENT:      Head: Normocephalic and atraumatic.      Mouth/Throat:      Mouth: Mucous membranes are moist.   Eyes:       Extraocular Movements: Extraocular movements intact.      Conjunctiva/sclera: Conjunctivae normal.      Pupils: Pupils are equal, round, and reactive to light.   Cardiovascular:      Rate and Rhythm: Normal rate.   Pulmonary:      Effort: Pulmonary effort is normal.   Abdominal:      General: Abdomen is flat.      Palpations: Abdomen is soft.           Comments: Get an appointment abdominal discomfort to deep palpation   Musculoskeletal:         General: Normal range of motion.      Cervical back: Normal range of motion and neck supple.   Skin:     General: Skin is warm.   Neurological:      General: No focal deficit present.      Mental Status: She is alert and oriented to person, place, and time. Mental status is at baseline.   Psychiatric:         Mood and Affect: Mood normal.            Assessment and Plan      1. Gallstones  -     Ambulatory referral/consult to General Surgery  -     Vital signs; Standing  -     Insert peripheral IV; Standing  -     Diet NPO; Standing  -     Case Request Operating Room: CHOLECYSTECTOMY, LAPAROSCOPIC, WITH CHOLANGIOGRAM  -     Full code; Standing  -     Place in Outpatient; Standing  -     Place HERO hose; Standing     Other orders  -     0.9% NaCl infusion  -     IP VTE LOW RISK PATIENT; Standing  -     ceFAZolin 2 g in D5W 50 mL IVPB (MB+)      Symptomatic cholelithiasis.  Recommend laparoscopic cholecystectomy.  Risks and benefits have been thoroughly explained to the patient.  She agrees to laparoscopic cholecystectomy.      Orders Placed This Encounter   Procedures    Insert peripheral IV         An After Visit Summary was printed and given to the patient.         Sy Thompson MD  [facility] 2nd Floor General Surgery  [address] [place] [address],[place] [address]  [phone]      This note was created using [employer] direct voice recognition software. Note may have occasional typographical errors that may not have been identified

## 2024-12-16 NOTE — ANESTHESIA POSTPROCEDURE EVALUATION
Anesthesia Post Evaluation    Patient: Mi Hancock    Procedure(s) Performed: Procedure(s) (LRB):  CHOLECYSTECTOMY, LAPAROSCOPIC, WITH CHOLANGIOGRAM (N/A)    Final Anesthesia Type: general      Patient location during evaluation: PACU  Patient participation: Yes- Able to Participate  Level of consciousness: awake and alert  Post-procedure vital signs: reviewed and stable  Pain management: adequate  Airway patency: patent    PONV status at discharge: No PONV  Anesthetic complications: no      Cardiovascular status: hemodynamically stable  Respiratory status: unassisted  Hydration status: euvolemic  Follow-up not needed.              Vitals Value Taken Time   /59 12/16/24 1230   Temp 36.2 °C (97.2 °F) 12/16/24 1000   Pulse 80 12/16/24 1232   Resp 14 12/16/24 1232   SpO2 100 % 12/16/24 1231   Vitals shown include unfiled device data.      Event Time   Out of Recovery 11:00:00         Pain/Vance Score: Pain Rating Prior to Med Admin: 10 (12/16/2024 10:24 AM)  Pain Rating Post Med Admin: 4 (12/16/2024 10:30 AM)  Vance Score: 9 (12/16/2024 11:00 AM)  Modified Vance Score: 19 (12/16/2024 12:41 PM)

## 2024-12-16 NOTE — TRANSFER OF CARE
"Anesthesia Transfer of Care Note    Patient: Mi Hancock    Procedure(s) Performed: Procedure(s) (LRB):  CHOLECYSTECTOMY, LAPAROSCOPIC, WITH CHOLANGIOGRAM (N/A)    Patient location: PACU    Anesthesia Type: general    Transport from OR: Transported from OR on room air with adequate spontaneous ventilation    Post pain: adequate analgesia    Post assessment: no apparent anesthetic complications and tolerated procedure well    Post vital signs: stable    Level of consciousness: awake, alert and oriented    Nausea/Vomiting: no nausea/vomiting    Complications: none    Transfer of care protocol was followed    Last vitals: Visit Vitals  BP (!) 179/79 (BP Location: Right arm, Patient Position: Lying)   Pulse 82   Temp 36.5 °C (97.7 °F) (Temporal)   Resp 17   Ht 5' 7" (1.702 m)   Wt 90.7 kg (200 lb)   SpO2 98%   Breastfeeding No   BMI 31.32 kg/m²     " Cimzia Pregnancy And Lactation Text: This medication crosses the placenta but can be considered safe in certain situations. Cimzia may be excreted in breast milk.

## 2024-12-16 NOTE — ANESTHESIA POSTPROCEDURE EVALUATION
Anesthesia Post Evaluation    Patient: Mi Hancock    Procedure(s) Performed: Procedure(s) (LRB):  CHOLECYSTECTOMY, LAPAROSCOPIC, WITH CHOLANGIOGRAM (N/A)    [facility] Anesthesia Post Op Evaluation      Vitals Value Taken Time   /59 12/16/24 1230   Temp 36.2 °C (97.2 °F) 12/16/24 1000   Pulse 80 12/16/24 1232   Resp 14 12/16/24 1232   SpO2 100 % 12/16/24 1231   Vitals shown include unfiled device data.      Event Time   Out of Recovery 11:00:00         Pain/Vance Score: Pain Rating Prior to Med Admin: 10 (12/16/2024 10:24 AM)  Pain Rating Post Med Admin: 4 (12/16/2024 10:30 AM)  Vance Score: 9 (12/16/2024 11:00 AM)  Modified Vance Score: 19 (12/16/2024 12:41 PM)

## 2024-12-16 NOTE — OP NOTE
Patient: Mi Hancock     Date of Procedure: 12/16/2024    Procedure:  Laparoscopic cholecystectomy with intraoperative cholangiogram    Surgeon: Sy Thompson MD    Assistant: None    Pre-op Diagnosis: Gallstones [K80.20] symptomatic cholelithiasis    Post-op Diagnosis: Gallstones [K80.20] symptomatic cholelithiasis    Procedure in Detail:  After informed consent was obtained, consent form signed, and questions answered, the surgical site was identified and marked appropriately.  The patient was then taken to the operating room where general endotracheal anesthesia was induced. Prophylactic IV antibiotics were administered.  The patient was positioned and the surgical field was prepped and draped in the usual sterile fashion. A thorough time-out procedure was performed with the surgical team.    Using an open technique and 11 mm trocar was placed in the infraumbilical region without difficulty.  Abdomen was next insufflated to 15 mm of mercury CO2 following which under direct visualization 3 separate 5 mm trocar was placed in a linear fashion subcostally in the right.  Gallbladder was identified and retracted cephalad and lateral.  There were adhesions to the gallbladder these these were taken down using a harmonic scalpel.  No visceral injuries were noted.  Gallbladder had a dull gray appearance consistent with chronic irritation.  Cystic duct was identified and dissected circumferentially.  Critical view was obtained.  Clip was placed in the cystic duct proximal to the gallbladder.  Cystic ductotomy was created through which intraoperative cholangiogram was performed which revealed normal ductal anatomy, no evidence of choledocholithiasis and prompt passage of contrast material into the small bowel.  Cholangiocatheter was removed and the cystic duct was doubly clipped proximally distally and divided.  Cystic artery was identified going to the wall of the gallbladder and clipped and divided in a similar fashion.   Gallbladder was dissected off the gallbladder fossa in a retrograde fashion staying on the posterior wall of the gallbladder using a harmonic scalpel long complications.  Gallbladder was removed from the abdomen using an Endo-Catch bag.  Gallbladder fossa was next irrigated with saline and once all excess cerumen was removed there was no evidence of bile or blood leakage from the gallbladder fossa, cystic duct or artery stump remnants respectively.  There being no other pathology appreciated trocar was removed under direct visualization without evidence of bleeding.  Fascia was closed inferiorly using interrupted 0 Vicryl suture.  Skin was closed with 4-0 Monocryl at all sites.  At the end the procedure all needle and sponge counts were correct x2 and patient was brought to the recovery room, extubated in hemodynamically stable condition.  Dressings were applied and the patient was awakened and transferred to the recovery room in stable condition. There were no immediate complications.    Specimen:  Gallbladder was passed off the table and sent for pathologic evaluation    EBL:  Less than 5 cc    Complications: None    This note was created using [employer] direct voice recognition software. Note may have occasional typographical errors that may not have been identified and edited despite initial review prior to signing.

## 2024-12-16 NOTE — ANESTHESIA PROCEDURE NOTES
Intubation    Date/Time: 12/16/2024 8:56 AM    Performed by: Ethan Cabrales CRNA  Authorized by: Nando Gautam MD    Intubation:     Induction:  Intravenous    Intubated:  Postinduction    Mask Ventilation:  Easy mask    Attempts:  1    Attempted By:  CRNA    Method of Intubation:  Video laryngoscopy    Blade:  Garay 3    Laryngeal View Grade: Grade I - full view of cords      Difficult Airway Encountered?: No      Complications:  None    Airway Device:  Oral endotracheal tube    Airway Device Size:  7.0    Style/Cuff Inflation:  Cuffed    Tube secured:  22    Secured at:  The teeth    Placement Verified By:  Capnometry    Complicating Factors:  None    Findings Post-Intubation:  BS equal bilateral

## 2024-12-16 NOTE — PLAN OF CARE
"Drowsy after phenergan administration. States, "the nausea is better I just feel like I could take a nap." O2 sat on room air 88-90%. Increases while using incentive spirometer. O2 2 liters nc reapplied. Encourage deep breathing and coughing.   "

## 2024-12-16 NOTE — DISCHARGE SUMMARY
[facility] Surgery  Discharge Note  Short Stay    Procedure(s) (LRB):  CHOLECYSTECTOMY, LAPAROSCOPIC, WITH CHOLANGIOGRAM (N/A)      OUTCOME: Patient tolerated treatment/procedure well without complication and is now ready for discharge.    DISPOSITION: Home or Self Care    FINAL DIAGNOSIS:  Symptomatic cholelithiasis    FOLLOWUP: In clinic in 2 weeks    DISCHARGE INSTRUCTIONS:  No discharge procedures on file.     TIME SPENT ON DISCHARGE:  10 minutes

## 2024-12-17 LAB
FINAL PATHOLOGIC DIAGNOSIS: NORMAL
GROSS: NORMAL
Lab: NORMAL
MICROSCOPIC EXAM: NORMAL

## 2024-12-18 VITALS
RESPIRATION RATE: 12 BRPM | DIASTOLIC BLOOD PRESSURE: 59 MMHG | HEART RATE: 78 BPM | OXYGEN SATURATION: 100 % | TEMPERATURE: 97 F | BODY MASS INDEX: 31.39 KG/M2 | SYSTOLIC BLOOD PRESSURE: 123 MMHG | WEIGHT: 200 LBS | HEIGHT: 67 IN

## 2024-12-26 ENCOUNTER — HOSPITAL ENCOUNTER (OUTPATIENT)
Dept: RADIOLOGY | Facility: HOSPITAL | Age: 69
Discharge: HOME | End: 2024-12-26
Attending: FAMILY MEDICINE
Payer: MEDICARE

## 2024-12-26 DIAGNOSIS — Z12.31 SCREENING MAMMOGRAM FOR BREAST CANCER: ICD-10-CM

## 2024-12-26 PROCEDURE — 77067 SCR MAMMO BI INCL CAD: CPT | Mod: 26,,, | Performed by: RADIOLOGY

## 2024-12-26 PROCEDURE — 77063 BREAST TOMOSYNTHESIS BI: CPT | Mod: 26,,, | Performed by: RADIOLOGY

## 2024-12-26 PROCEDURE — 77067 SCR MAMMO BI INCL CAD: CPT | Mod: TC

## 2024-12-27 DIAGNOSIS — R92.8 ABNORMAL MAMMOGRAM OF RIGHT BREAST: Primary | ICD-10-CM

## 2025-01-02 ENCOUNTER — OFFICE VISIT (OUTPATIENT)
Dept: SURGERY | Facility: CLINIC | Age: 70
End: 2025-01-02
Payer: MEDICARE

## 2025-01-02 VITALS
DIASTOLIC BLOOD PRESSURE: 75 MMHG | OXYGEN SATURATION: 98 % | BODY MASS INDEX: 31.38 KG/M2 | TEMPERATURE: 98 F | SYSTOLIC BLOOD PRESSURE: 147 MMHG | HEIGHT: 67 IN | WEIGHT: 199.94 LBS

## 2025-01-02 DIAGNOSIS — K80.20 GALLSTONES: Primary | ICD-10-CM

## 2025-01-02 PROCEDURE — 99213 OFFICE O/P EST LOW 20 MIN: CPT | Mod: PBBFAC | Performed by: SPECIALIST

## 2025-01-02 PROCEDURE — 99999 PR PBB SHADOW E&M-EST. PATIENT-LVL III: CPT | Mod: PBBFAC,,, | Performed by: SPECIALIST

## 2025-01-02 PROCEDURE — 99024 POSTOP FOLLOW-UP VISIT: CPT | Mod: POP,,, | Performed by: SPECIALIST

## 2025-01-02 NOTE — PROGRESS NOTES
"Patient is a 69 y.o. female who presents for postoperative evaluation following  open cholecystectomy    Chief Complaint   Patient presents with    Post-op Evaluation     Lap an DOS: 12/16/2024        Vitals:    01/02/25 1327   BP: (!) 147/75   BP Location: Right arm   Patient Position: Sitting   Temp: 97.9 °F (36.6 °C)   TempSrc: Oral   SpO2: 98%   Weight: 90.7 kg (199 lb 15.3 oz)   Height: 5' 7" (1.702 m)        Subjective      Physical Exam     Incision clean dry and intact    Drains none    Pathology thoroughly discussed    Assessment & Plan     Doing well status post laparoscopic cholecystectomy  Recommendations no heavy lifting for another couple of weeks    No orders of the defined types were placed in this encounter.       Follow-up p.r.n.    Sy Thompson MD  General Surgery  [address]   [place] [address]      Patient instructed that best way to communicate with my office staff is for patient to get on the [facility] epic patient portal to expedite communication and communication issues that may occur.  Patient was given instructions on how to get on the portal.  I encouraged patient to obtain portal access as well.  Ultimately it is up to the patient to obtain access.  Patient voiced understanding.    "

## 2025-01-09 ENCOUNTER — HOSPITAL ENCOUNTER (OUTPATIENT)
Dept: RADIOLOGY | Facility: HOSPITAL | Age: 70
Discharge: HOME | End: 2025-01-09
Attending: NURSE PRACTITIONER
Payer: MEDICARE

## 2025-01-09 DIAGNOSIS — R92.8 ABNORMAL MAMMOGRAM OF RIGHT BREAST: ICD-10-CM

## 2025-01-09 DIAGNOSIS — R92.8 ABNORMAL MAMMOGRAM: Primary | ICD-10-CM

## 2025-01-09 PROCEDURE — 76642 ULTRASOUND BREAST LIMITED: CPT | Mod: TC,RT

## 2025-01-09 PROCEDURE — 77065 DX MAMMO INCL CAD UNI: CPT | Mod: TC,RT

## 2025-01-09 PROCEDURE — 77065 DX MAMMO INCL CAD UNI: CPT | Mod: 26,RT,, | Performed by: RADIOLOGY

## 2025-01-09 PROCEDURE — 77061 BREAST TOMOSYNTHESIS UNI: CPT | Mod: 26,RT,, | Performed by: RADIOLOGY

## 2025-01-09 PROCEDURE — 76642 ULTRASOUND BREAST LIMITED: CPT | Mod: 26,RT,, | Performed by: RADIOLOGY

## 2025-01-28 ENCOUNTER — OFFICE VISIT (OUTPATIENT)
Dept: FAMILY MEDICINE | Facility: CLINIC | Age: 70
End: 2025-01-28
Payer: MEDICARE

## 2025-01-28 VITALS
HEART RATE: 106 BPM | DIASTOLIC BLOOD PRESSURE: 78 MMHG | OXYGEN SATURATION: 97 % | RESPIRATION RATE: 14 BRPM | WEIGHT: 207.81 LBS | TEMPERATURE: 99 F | HEIGHT: 67 IN | BODY MASS INDEX: 32.62 KG/M2 | SYSTOLIC BLOOD PRESSURE: 134 MMHG

## 2025-01-28 DIAGNOSIS — R50.9 FEVER, UNSPECIFIED FEVER CAUSE: ICD-10-CM

## 2025-01-28 DIAGNOSIS — R11.2 NAUSEA AND VOMITING, UNSPECIFIED VOMITING TYPE: Primary | ICD-10-CM

## 2025-01-28 DIAGNOSIS — J10.1 INFLUENZA A: ICD-10-CM

## 2025-01-28 DIAGNOSIS — R05.1 ACUTE COUGH: ICD-10-CM

## 2025-01-28 DIAGNOSIS — R51.9 NONINTRACTABLE HEADACHE, UNSPECIFIED CHRONICITY PATTERN, UNSPECIFIED HEADACHE TYPE: ICD-10-CM

## 2025-01-28 LAB
CTP QC/QA: YES
CTP QC/QA: YES
POC MOLECULAR INFLUENZA A AGN: POSITIVE
POC MOLECULAR INFLUENZA B AGN: NEGATIVE
SARS-COV-2 RDRP RESP QL NAA+PROBE: NEGATIVE

## 2025-01-28 PROCEDURE — 99999PBSHW PR PBB SHADOW TECHNICAL ONLY FILED TO HB: Mod: PBBFAC,,,

## 2025-01-28 PROCEDURE — 99213 OFFICE O/P EST LOW 20 MIN: CPT | Mod: PBBFAC | Performed by: FAMILY MEDICINE

## 2025-01-28 PROCEDURE — 99999 PR PBB SHADOW E&M-EST. PATIENT-LVL III: CPT | Mod: PBBFAC,,, | Performed by: FAMILY MEDICINE

## 2025-01-28 PROCEDURE — 99999PBSHW: Mod: PBBFAC,,,

## 2025-01-28 PROCEDURE — 99214 OFFICE O/P EST MOD 30 MIN: CPT | Mod: S$PBB,,, | Performed by: FAMILY MEDICINE

## 2025-01-28 PROCEDURE — 87502 INFLUENZA DNA AMP PROBE: CPT | Mod: PBBFAC | Performed by: FAMILY MEDICINE

## 2025-01-28 PROCEDURE — S0119 ONDANSETRON 4 MG: HCPCS | Mod: PBBFAC

## 2025-01-28 PROCEDURE — 99999PBSHW POCT INFLUENZA A/B MOLECULAR: Mod: PBBFAC,,,

## 2025-01-28 PROCEDURE — 87635 SARS-COV-2 COVID-19 AMP PRB: CPT | Mod: PBBFAC | Performed by: FAMILY MEDICINE

## 2025-01-28 RX ORDER — BENZONATATE 200 MG/1
200 CAPSULE ORAL 3 TIMES DAILY PRN
Qty: 20 CAPSULE | Refills: 0 | Status: SHIPPED | OUTPATIENT
Start: 2025-01-28 | End: 2025-02-04

## 2025-01-28 RX ORDER — ONDANSETRON 8 MG/1
8 TABLET, ORALLY DISINTEGRATING ORAL EVERY 12 HOURS PRN
Qty: 30 TABLET | Refills: 0 | Status: SHIPPED | OUTPATIENT
Start: 2025-01-28

## 2025-01-28 RX ORDER — OSELTAMIVIR PHOSPHATE 75 MG/1
75 CAPSULE ORAL 2 TIMES DAILY
Qty: 10 CAPSULE | Refills: 0 | Status: SHIPPED | OUTPATIENT
Start: 2025-01-28 | End: 2025-02-02

## 2025-01-28 RX ORDER — ONDANSETRON 4 MG/1
8 TABLET, ORALLY DISINTEGRATING ORAL
Status: COMPLETED | OUTPATIENT
Start: 2025-01-28 | End: 2025-01-28

## 2025-01-28 RX ADMIN — ONDANSETRON 8 MG: 4 TABLET, ORALLY DISINTEGRATING ORAL at 11:01

## 2025-01-28 NOTE — PROGRESS NOTES
Patient ID: Mi Hancock is a 69 y.o. female.    Chief Complaint: Emesis, Cough, Headache, and Fever (/)    History of Present Illness    CHIEF COMPLAINT:  Mi presents today with nausea, vomiting, and low-grade fever.    HISTORY OF PRESENT ILLNESS:  She has had nausea and vomiting since Sunday night and is unable to keep any fluids down, including water. She reports a low-grade fever of 99°F. She has developed a severe headache 2 days ago, along with sneezing and sore throat. She also notes constant ear ringing without ear pain.    MEDICAL HISTORY:  She has a history of migraines which previously occurred multiple times per year. Following hysterectomy at age 48, migraine frequency significantly decreased to 1-2 times annually, typically triggered by weather changes.      ROS:  ROS as indicated in HPI.         Physical Exam    Vitals: Tachycardia: 106 bpm.  General: No acute distress. Well-developed. Well-nourished.  Eyes: EOMI. Sclerae anicteric.  HENT: Normocephalic. Atraumatic. Nares patent. Moist oral mucosa.  Ears: Cerumen in right ear.  Cardiovascular: Regular rate. Regular rhythm. No murmurs. No rubs. No gallops. Normal S1, S2.  Respiratory: Normal respiratory effort. Clear to auscultation bilaterally. No rales. No rhonchi. No wheezing.  Musculoskeletal: No  obvious deformity.  Extremities: No lower extremity edema.  Neurological: Alert & oriented x3. No slurred speech. Normal gait.  Psychiatric: Normal mood. Normal affect. Good insight. Good judgment.  Skin: Warm. Dry. No rash.         Assessment & Plan    IMPRESSION:  - Assessed patient presenting with low-grade fever, nausea, vomiting, headache, and other symptoms for 2 days  - Considered possibility of stomach virus or flu, given current prevalence  - Noted tachycardia likely due to illness  - Will treat symptoms, particularly nausea, with Zofran    VIRAL INFECTION:  - Discussed the current stomach virus circulating, typically lasting about 1 week.  -  Explained that most viruses need to run their course, with treatment focused on symptom management.  - Noted that the patient has had symptoms for 2 days, since Sunday night.  - Considered the possibility of flu or another viral infection based on additional symptoms.  - Performed a physical exam to assess the patient's condition.    FEVER:  - Confirmed the presence of a low-grade fever of approximately 99°F.  - Explained that tachycardia can occur with fever.    NAUSEA AND VOMITING:  - Noted the patient's reports of vomiting and inability to keep anything down, including water.  - Confirmed ongoing nausea and vomiting.  - Explained that tachycardia can occur with vomiting.  - Administered 8 mg (two 4 mg tablets) of Zofran orally disintegrating tablets (ODT) in office.  - Instructed the patient to place tablets under tongue and let dissolve.  - Prescribed additional Zofran 8 mg ODT for use at home.    HEADACHE:  - Noted the patient's report of a significant headache.    TACHYCARDIA:  - Observed tachycardia during exam with a heart rate of 106 bpm.  - Explained tachycardia as a result of fever or vomiting.    TINNITUS:  - Noted the patient's report of constant tinnitus.  - Examined both ears and found them to appear normal.    FOLLOW UP:  - Follow up with regular doctor, Dr. Yesica Harvey, for follow-up and full labs.         Plan:          Nausea and vomiting, unspecified vomiting type  -     POCT COVID-19 Rapid Screening  -     POCT Influenza A/B Molecular  -     ondansetron disintegrating tablet 8 mg    Nonintractable headache, unspecified chronicity pattern, unspecified headache type  -     POCT COVID-19 Rapid Screening  -     POCT Influenza A/B Molecular  -     ondansetron disintegrating tablet 8 mg    Fever, unspecified fever cause  -     POCT COVID-19 Rapid Screening  -     POCT Influenza A/B Molecular    Influenza A  -     oseltamivir (TAMIFLU) 75 MG capsule; Take 1 capsule (75 mg total) by mouth 2 (two)  times daily. for 5 days  Dispense: 10 capsule; Refill: 0    Acute cough  -     benzonatate (TESSALON) 200 MG capsule; Take 1 capsule (200 mg total) by mouth 3 (three) times daily as needed for Cough.  Dispense: 20 capsule; Refill: 0    Other orders  -     ondansetron (ZOFRAN-ODT) 8 MG TbDL; Take 1 tablet (8 mg total) by mouth every 12 (twelve) hours as needed (nausea vomiting).  Dispense: 30 tablet; Refill: 0        Follow up if symptoms worsen or fail to improve.    This note was generated with the assistance of ambient listening technology. Verbal consent was obtained by the patient and accompanying visitor(s) for the recording of patient appointment to facilitate this note. I attest to having reviewed and edited the generated note for accuracy, though some syntax or spelling errors may persist. Please contact the author of this note for any clarification.

## 2025-04-07 ENCOUNTER — OFFICE VISIT (OUTPATIENT)
Dept: PODIATRY | Facility: CLINIC | Age: 70
End: 2025-04-07
Payer: MEDICARE

## 2025-04-07 VITALS
HEIGHT: 67 IN | HEART RATE: 75 BPM | BODY MASS INDEX: 32.63 KG/M2 | DIASTOLIC BLOOD PRESSURE: 80 MMHG | WEIGHT: 207.88 LBS | SYSTOLIC BLOOD PRESSURE: 175 MMHG

## 2025-04-07 DIAGNOSIS — M76.62 ACHILLES TENDINITIS OF LEFT LOWER EXTREMITY: ICD-10-CM

## 2025-04-07 DIAGNOSIS — M72.2 PLANTAR FASCIITIS: Primary | ICD-10-CM

## 2025-04-07 PROCEDURE — 99213 OFFICE O/P EST LOW 20 MIN: CPT | Mod: PBBFAC | Performed by: PODIATRIST

## 2025-04-07 PROCEDURE — 99203 OFFICE O/P NEW LOW 30 MIN: CPT | Mod: S$PBB,,, | Performed by: PODIATRIST

## 2025-04-07 PROCEDURE — 99999 PR PBB SHADOW E&M-EST. PATIENT-LVL III: CPT | Mod: PBBFAC,,, | Performed by: PODIATRIST

## 2025-04-07 RX ORDER — MELOXICAM 7.5 MG/1
7.5 TABLET ORAL DAILY
Qty: 30 TABLET | Refills: 0 | Status: SHIPPED | OUTPATIENT
Start: 2025-04-07 | End: 2025-05-07

## 2025-04-08 NOTE — PROGRESS NOTES
Subjective:       Patient ID: Mi Hancock is a 69 y.o. female.    Chief Complaint: Foot Pain    Patient presents today with a complaint of left heel pain she is also having pain that extends up the back of the heel she relates that she had fractured her left foot in the region of the 5th metatarsal 2 years ago and this area does stay sore to some degree.  Patient relates no recent injury trauma or change in activities.  Patient states she does wear ortho feet shoes she has high arches and she tries to make sure she has appropriate support.  Past Medical History:   Diagnosis Date    Allergy     Angio-edema     Limb alert care status     left    Melanoma     Post-operative nausea and vomiting     Thyroid nodule      Past Surgical History:   Procedure Laterality Date    APPENDECTOMY      HYSTERECTOMY      LAPAROSCOPIC CHOLECYSTECTOMY WITH CHOLANGIOGRAPHY N/A 2024    Procedure: CHOLECYSTECTOMY, LAPAROSCOPIC, WITH CHOLANGIOGRAM;  Surgeon: Sy Thompson MD;  Location: [facility];  Service: General;  Laterality: N/A;    lymphnode removal Left     melanoma Left     flank     Family History   Problem Relation Name Age of Onset    Osteoarthritis Mother Mady     Arthritis Mother Mady     Heart disease Father Delos     Breast cancer Maternal Aunt      Breast cancer Paternal Aunt      Allergies Brother      Allergic rhinitis Neg Hx      Angioedema Neg Hx      Asthma Neg Hx      Atopy Neg Hx      Eczema Neg Hx      Immunodeficiency Neg Hx      Rhinitis Neg Hx      Urticaria Neg Hx       Social History     Socioeconomic History    Marital status:    Tobacco Use    Smoking status: Former     Current packs/day: 0.00     Average packs/day: 1 pack/day for 10.0 years (10.0 ttl pk-yrs)     Types: Cigarettes     Start date: 1981     Quit date: 1991     Years since quittin.2    Smokeless tobacco: Never   Substance and Sexual Activity    Alcohol use: Yes     Alcohol/week: 2.0 standard drinks of alcohol      "Types: 2 Glasses of wine per week    Drug use: Never    Sexual activity: Not Currently     Partners: Male     Comment: Was on Birth Control Pills     Social Drivers of Health     Financial Resource Strain: Low Risk  (11/18/2024)    Overall Financial Resource Strain (CARDIA)     Difficulty of Paying Living Expenses: Not hard at all   Food Insecurity: No Food Insecurity (11/18/2024)    Hunger Vital Sign     Worried About Running Out of Food in the Last Year: Never true     Ran Out of Food in the Last Year: Never true   Transportation Needs: No Transportation Needs (9/3/2019)    [facility] from [facility] Subsidiaries and Affiliates    [facility]     Lack of Transportation (Medical): No     Lack of Transportation (Non-Medical): No   Physical Activity: Patient Declined (11/18/2024)    Exercise Vital Sign     Days of Exercise per Week: Patient declined     Minutes of Exercise per Session: Patient declined   Stress: No Stress Concern Present (11/18/2024)    [employer] [place] [employer] - Occupational Stress Questionnaire     Feeling of Stress : Only a little   Housing Stability: Unknown (11/18/2024)    Housing Stability Vital Sign     Unable to Pay for Housing in the Last Year: No       Current Medications[1]  Review of patient's allergies indicates:  No Known Allergies    Review of Systems   Musculoskeletal:  Positive for arthralgias.   All other systems reviewed and are negative.      Objective:      Vitals:    04/07/25 1353   BP: (!) 175/80   Pulse: 75   Weight: 94.3 kg (207 lb 14.3 oz)   Height: 5' 7" (1.702 m)     Physical Exam  Vitals and nursing note reviewed.   Constitutional:       Appearance: Normal appearance.   Cardiovascular:      Pulses:           Dorsalis pedis pulses are 2+ on the right side and 2+ on the left side.        Posterior tibial pulses are 2+ on the right side and 2+ on the left side.   Pulmonary:      Effort: Pulmonary " effort is normal.   Musculoskeletal:         General: Swelling and tenderness present.        Feet:    Feet:      Right foot:      Protective Sensation: 2 sites tested.  2 sites sensed.      Skin integrity: Skin integrity normal.      Left foot:      Protective Sensation: 2 sites tested.  2 sites sensed.      Skin integrity: Erythema and warmth present.   Skin:     Capillary Refill: Capillary refill takes less than 2 seconds.      Findings: Erythema present.   Neurological:      General: No focal deficit present.      Mental Status: She is alert.   Psychiatric:         Mood and Affect: Mood normal.         Behavior: Behavior normal.                          Assessment:       1. Plantar fasciitis    2. Achilles tendinitis of left lower extremity        Plan:       Patient presents today with a complaint of left heel pain she is also having pain that extends up the back of the heel she relates that she had fractured her left foot in the region of the 5th metatarsal 2 years ago and this area does stay sore to some degree.  Patient relates no recent injury trauma or change in activities.  Patient states she does wear ortho feet shoes she has high arches and she tries to make sure she has appropriate support.  A comprehensive new patient evaluation was performed today patient indicated she also has been having pain not only in the left heel but this did extend into the left arch especially while she was in bed.  Patient has findings consistent with plantar fasciitis she also has some mild Achilles tendinitis I explained to the patient this does go hand in hand with the plantar fasciitis she does have significantly elevated arches both weight-bearing and nonweightbearing bilateral.  Notable inflammation is present at the plantar aspect of the patient's left heel there is increased skin temperature erythema edema of the left heel in comparison to the right.  I did discuss proper use of arch support at all times clearly  even though the patient is wearing good shoes she is not getting enough arch support I did add some medium blue arch pads to the patient's ortho feet shoes and the insoles building up the arch to give her better support she wears these all the time she does not go barefoot I have recommended Oofos as a good option to wear in the house.  I advised the patient we will see her in several weeks we will see how she is doing with the additional arch support I did dispensed additional blue arch pads to the patient's so she can put these in other shoes if need be I have started the patient on Mobic 7.5 mg daily she does not tolerate ibuprofen well I am hoping the low dose of Mobic once a day will be tolerable and this will help with her inflammation she does have notable degenerative arthritic changes encompassing the 1st MPJ bilateral.  X-rays were not necessary at this point however if the patient is not responding not doing well I will consider x-rays at follow-up I do anticipate the patient to be improving substantially with the additional support and I advised her we can make adjustments to her support as deemed appropriate.  Patient will contact us with any problems questions or concerns prior to scheduled follow-up.This note was created using [employer]. voice recognition software that occasionally misinterpreted phrases or words.        [1]   Current Outpatient Medications   Medication Sig Dispense Refill    amLODIPine (NORVASC) 5 MG tablet Take 5 mg by mouth once daily.      ondansetron (ZOFRAN-ODT) 8 MG TbDL Take 1 tablet (8 mg total) by mouth every 12 (twelve) hours as needed (nausea vomiting). 30 tablet 0    meloxicam (MOBIC) 7.5 MG tablet Take 1 tablet (7.5 mg total) by mouth once daily. 30 tablet 0     No current facility-administered medications for this visit.

## 2025-04-25 ENCOUNTER — PATIENT MESSAGE (OUTPATIENT)
Dept: ADMINISTRATIVE | Facility: HOSPITAL | Age: 70
End: 2025-04-25
Payer: MEDICARE

## 2025-04-30 ENCOUNTER — HOSPITAL ENCOUNTER (OUTPATIENT)
Dept: RADIOLOGY | Facility: HOSPITAL | Age: 70
Discharge: HOME | End: 2025-04-30
Attending: PODIATRIST
Payer: MEDICARE

## 2025-04-30 ENCOUNTER — OFFICE VISIT (OUTPATIENT)
Dept: PODIATRY | Facility: CLINIC | Age: 70
End: 2025-04-30
Payer: MEDICARE

## 2025-04-30 VITALS
DIASTOLIC BLOOD PRESSURE: 91 MMHG | SYSTOLIC BLOOD PRESSURE: 161 MMHG | WEIGHT: 207.88 LBS | BODY MASS INDEX: 32.63 KG/M2 | HEIGHT: 67 IN | HEART RATE: 76 BPM

## 2025-04-30 DIAGNOSIS — M72.2 PLANTAR FASCIITIS: ICD-10-CM

## 2025-04-30 DIAGNOSIS — M76.62 ACHILLES TENDINITIS OF LEFT LOWER EXTREMITY: ICD-10-CM

## 2025-04-30 DIAGNOSIS — M72.2 PLANTAR FASCIITIS: Primary | ICD-10-CM

## 2025-04-30 PROCEDURE — 99999 PR PBB SHADOW E&M-EST. PATIENT-LVL III: CPT | Mod: PBBFAC,,, | Performed by: PODIATRIST

## 2025-04-30 PROCEDURE — 73630 X-RAY EXAM OF FOOT: CPT | Mod: TC,LT

## 2025-04-30 PROCEDURE — 99214 OFFICE O/P EST MOD 30 MIN: CPT | Mod: 25,S$PBB,, | Performed by: PODIATRIST

## 2025-04-30 PROCEDURE — 99213 OFFICE O/P EST LOW 20 MIN: CPT | Mod: PBBFAC,25 | Performed by: PODIATRIST

## 2025-04-30 PROCEDURE — 73630 X-RAY EXAM OF FOOT: CPT | Mod: 26,LT,, | Performed by: RADIOLOGY

## 2025-04-30 PROCEDURE — 96372 THER/PROPH/DIAG INJ SC/IM: CPT | Mod: PBBFAC,59

## 2025-04-30 PROCEDURE — 99999PBSHW PR PBB SHADOW TECHNICAL ONLY FILED TO HB: Mod: PBBFAC,,,

## 2025-04-30 RX ORDER — KETOROLAC TROMETHAMINE 30 MG/ML
30 INJECTION, SOLUTION INTRAMUSCULAR; INTRAVENOUS
Status: COMPLETED | OUTPATIENT
Start: 2025-04-30 | End: 2025-04-30

## 2025-04-30 RX ORDER — BETAMETHASONE SODIUM PHOSPHATE AND BETAMETHASONE ACETATE 3; 3 MG/ML; MG/ML
12 INJECTION, SUSPENSION INTRA-ARTICULAR; INTRALESIONAL; INTRAMUSCULAR; SOFT TISSUE
Status: COMPLETED | OUTPATIENT
Start: 2025-04-30 | End: 2025-04-30

## 2025-04-30 RX ADMIN — KETOROLAC TROMETHAMINE 30 MG: 30 INJECTION, SOLUTION INTRAMUSCULAR; INTRAVENOUS at 02:04

## 2025-04-30 RX ADMIN — BETAMETHASONE SODIUM PHOSPHATE AND BETAMETHASONE ACETATE 12 MG: 3; 3 INJECTION, SUSPENSION INTRA-ARTICULAR; INTRALESIONAL; INTRAMUSCULAR at 02:04

## 2025-05-04 NOTE — PROGRESS NOTES
Subjective:       Patient ID: Mi Hancock is a 69 y.o. female.    Chief Complaint: Plantar Fasciitis    Patient presents today with a complaint of left heel pain she is also having pain that extends up the back of the heel.    Past Medical History:   Diagnosis Date    Allergy     Angio-edema     Limb alert care status 2010    left    Melanoma     Post-operative nausea and vomiting     Thyroid nodule      Past Surgical History:   Procedure Laterality Date    APPENDECTOMY      HYSTERECTOMY      LAPAROSCOPIC CHOLECYSTECTOMY WITH CHOLANGIOGRAPHY N/A 2024    Procedure: CHOLECYSTECTOMY, LAPAROSCOPIC, WITH CHOLANGIOGRAM;  Surgeon: Sy Thompson MD;  Location: [facility] OR;  Service: General;  Laterality: N/A;    lymphnode removal Left     melanoma Left     flank     Family History   Problem Relation Name Age of Onset    Osteoarthritis Mother Mady     Arthritis Mother Mady     Heart disease Father Delos     Breast cancer Maternal Aunt      Breast cancer Paternal Aunt      Allergies Brother      Allergic rhinitis Neg Hx      Angioedema Neg Hx      Asthma Neg Hx      Atopy Neg Hx      Eczema Neg Hx      Immunodeficiency Neg Hx      Rhinitis Neg Hx      Urticaria Neg Hx       Social History     Socioeconomic History    Marital status:    Tobacco Use    Smoking status: Former     Current packs/day: 0.00     Average packs/day: 1 pack/day for 10.0 years (10.0 ttl pk-yrs)     Types: Cigarettes     Start date: 1981     Quit date: 1991     Years since quittin.3    Smokeless tobacco: Never   Substance and Sexual Activity    Alcohol use: Yes     Alcohol/week: 2.0 standard drinks of alcohol     Types: 2 Glasses of wine per week    Drug use: Never    Sexual activity: Not Currently     Partners: Male     Comment: Was on Birth Control Pills     Social Drivers of Health     Financial Resource Strain: Low Risk  (2024)    Overall Financial Resource Strain (CARDIA)     Difficulty of Paying Living Expenses:  "Not hard at all   Food Insecurity: No Food Insecurity (11/18/2024)    Hunger Vital Sign     Worried About Running Out of Food in the Last Year: Never true     Ran Out of Food in the Last Year: Never true   Transportation Needs: No Transportation Needs (9/3/2019)    [facility] and Its Subsidiaries and Affiliates    PRAPARE - Transportation     Lack of Transportation (Medical): No     Lack of Transportation (Non-Medical): No   Physical Activity: Patient Declined (11/18/2024)    Exercise Vital Sign     Days of Exercise per Week: Patient declined     Minutes of Exercise per Session: Patient declined   Stress: No Stress Concern Present (11/18/2024)    [place] [employer] - Occupational Stress Questionnaire     Feeling of Stress : Only a little   Housing Stability: Unknown (11/18/2024)    Housing Stability Vital Sign     Unable to Pay for Housing in the Last Year: No       Current Medications[1]  Review of patient's allergies indicates:  No Known Allergies    Review of Systems   Musculoskeletal:  Positive for arthralgias.   All other systems reviewed and are negative.      Objective:      Vitals:    04/30/25 1333   BP: (!) 161/91   Pulse: 76   Weight: 94.3 kg (207 lb 14.3 oz)   Height: 5' 7" (1.702 m)     Physical Exam  Vitals and nursing note reviewed.   Constitutional:       Appearance: Normal appearance.   Cardiovascular:      Pulses:           Dorsalis pedis pulses are 2+ on the right side and 2+ on the left side.        Posterior tibial pulses are 2+ on the right side and 2+ on the left side.   Pulmonary:      Effort: Pulmonary effort is normal.   Musculoskeletal:         General: Swelling and tenderness present.        Feet:    Feet:      Right foot:      Protective Sensation: 2 sites tested.  2 sites sensed.      Skin integrity: Skin integrity normal.      Left foot:      Protective Sensation: 2 sites tested.  2 sites sensed.      Skin " integrity: Erythema and warmth present.   Skin:     Capillary Refill: Capillary refill takes less than 2 seconds.      Findings: Erythema present.   Neurological:      General: No focal deficit present.      Mental Status: She is alert.   Psychiatric:         Mood and Affect: Mood normal.         Behavior: Behavior normal.                                Assessment:       1. Plantar fasciitis    2. Achilles tendinitis of left lower extremity        Plan:       Patient presents today with a complaint of left heel pain she is also having pain that extends up the back of the heel.  Patient indicates she did not tolerate the low dose of meloxicam 7.5 mg daily she states it made her sick.  Patient does have the Oofos and states that these have been okay I did recommend adding extra support to these in addition to the patient's shoes.  X-rays were taken today because the patient is still having considerable heel pain she does have a small posterior and plantar calcaneal spur which I advised her it is very common.  Patient did display severe degenerative changes overlying the 1st MPJ with the extensive spurring however this is not the area where the patient is having considerable discomfort.  I did advised the patient at length and in detail clearly we have to go with a more aggressive support the blue arch pads in the patient's tennis shoes are not enough support the blue arch pads may be enough support on her sandals or her Oofos but certainly not in her tennis shoes.  I did fit the patient for and dispensed the patient some power step control arch supports women size 10 she is to use these at all times unless she is in the house and at that point she can wear her Oofos with the additional support.  Patient advised the additional support of the power steps should make a considerable difference relieving her heel pain completely I did recommend an IM Celestone injection IM Toradol injection to help settle down the  inflammation because she is not able to tolerate an oral anti-inflammatory.  Patient was in understanding and agreement with this.  Patient tolerated the injections well.  Patient advised over the next several weeks she should see considerable improvement if not or should her condition worsens she is to contact us immediately.  This note was created using [employer] voice recognition software that occasionally misinterpreted phrases or words.     30 minutes of total time spent on the encounter, which includes face to face time and non-face to face time preparing to see the patient (eg, review of tests), Obtaining and/or reviewing separately obtained history, Documenting clinical information in the electronic or other health record, Independently interpreting results (not separately reported) and communicating results to the patient/family/caregiver, or Care coordination (not separately reported).  This includes discussion evaluation imaging review at length and in detail discussion of treatment plan fitting the patient for power step arch supports with the adjustments.         [1]   Current Outpatient Medications   Medication Sig Dispense Refill    amLODIPine (NORVASC) 5 MG tablet Take 5 mg by mouth once daily.      ondansetron (ZOFRAN-ODT) 8 MG TbDL Take 1 tablet (8 mg total) by mouth every 12 (twelve) hours as needed (nausea vomiting). 30 tablet 0     No current facility-administered medications for this visit.

## 2025-06-23 DIAGNOSIS — Z00.00 ENCOUNTER FOR MEDICARE ANNUAL WELLNESS EXAM: ICD-10-CM

## 2025-06-24 ENCOUNTER — PATIENT MESSAGE (OUTPATIENT)
Dept: ADMINISTRATIVE | Facility: HOSPITAL | Age: 70
End: 2025-06-24
Payer: MEDICARE

## 2025-06-25 DIAGNOSIS — Z12.11 SCREENING FOR COLON CANCER: ICD-10-CM

## 2025-07-09 ENCOUNTER — HOSPITAL ENCOUNTER (OUTPATIENT)
Dept: RADIOLOGY | Facility: HOSPITAL | Age: 70
Discharge: HOME | End: 2025-07-09
Attending: FAMILY MEDICINE
Payer: MEDICARE

## 2025-07-09 DIAGNOSIS — R92.8 ABNORMAL MAMMOGRAM: ICD-10-CM

## 2025-07-09 PROCEDURE — 77061 BREAST TOMOSYNTHESIS UNI: CPT | Mod: TC,RT

## 2025-07-09 PROCEDURE — 77065 DX MAMMO INCL CAD UNI: CPT | Mod: 26,RT,, | Performed by: RADIOLOGY

## 2025-07-09 PROCEDURE — 77061 BREAST TOMOSYNTHESIS UNI: CPT | Mod: 26,RT,, | Performed by: RADIOLOGY

## 2025-07-09 PROCEDURE — 76642 ULTRASOUND BREAST LIMITED: CPT | Mod: 26,RT,, | Performed by: RADIOLOGY

## 2025-07-09 PROCEDURE — 76642 ULTRASOUND BREAST LIMITED: CPT | Mod: TC,FY,RT

## 2025-07-18 ENCOUNTER — HOSPITAL ENCOUNTER (OUTPATIENT)
Dept: RADIOLOGY | Facility: HOSPITAL | Age: 70
Discharge: HOME | End: 2025-07-18
Attending: FAMILY MEDICINE
Payer: MEDICARE

## 2025-07-18 DIAGNOSIS — R92.8 ABNORMAL MAMMOGRAM OF RIGHT BREAST: ICD-10-CM

## 2025-07-18 PROCEDURE — 77065 DX MAMMO INCL CAD UNI: CPT | Mod: 26,RT,, | Performed by: RADIOLOGY

## 2025-07-18 PROCEDURE — 77061 BREAST TOMOSYNTHESIS UNI: CPT | Mod: TC,RT

## 2025-07-18 PROCEDURE — 19083 BX BREAST 1ST LESION US IMAG: CPT | Mod: RT,,, | Performed by: RADIOLOGY

## 2025-07-18 PROCEDURE — A4648 IMPLANTABLE TISSUE MARKER: HCPCS

## 2025-07-18 PROCEDURE — 77061 BREAST TOMOSYNTHESIS UNI: CPT | Mod: 26,RT,, | Performed by: RADIOLOGY

## 2025-07-18 PROCEDURE — 88305 TISSUE EXAM BY PATHOLOGIST: CPT | Mod: TC | Performed by: FAMILY MEDICINE

## 2025-07-21 LAB
DHEA SERPL-MCNC: NORMAL
ESTROGEN SERPL-MCNC: NORMAL PG/ML
INSULIN SERPL-ACNC: NORMAL U[IU]/ML
LAB AP CLINICAL INFORMATION: NORMAL
LAB AP GROSS DESCRIPTION: NORMAL
LAB AP PERFORMING LOCATION(S): NORMAL
LAB AP REPORT FOOTNOTES: NORMAL

## 2025-07-22 ENCOUNTER — TELEPHONE (OUTPATIENT)
Facility: CLINIC | Age: 70
End: 2025-07-22
Payer: MEDICARE

## 2025-07-22 NOTE — TELEPHONE ENCOUNTER
Breast, right, 9 o'clock, 6 cm from nipple, ultrasound-guided core needle biopsies of mass:  - Fragments of dilated duct/ cyst wall, columnar cell change and columnar cell hyperplasia, apocrine metaplasia, and dense stromal fibrosis   - Negative for atypical hyperplasia or carcinoma  - Multiple H&E levels evaluated    LVM asking pt to return my call in regards to breast biopsy results.     Pathology results demonstrate the following:  dilated duct/cyst wall, columnar cell change/hyperplasia, apocrine metaplasia, dense stromal fibrosis     This is benign and concordant with imaging findings. Six month follow-up mammogram recommended. The patient will be due for a bilateral mammogram at that time.  Spoke with pt . She verbalized understanding.

## 2025-07-24 ENCOUNTER — PATIENT MESSAGE (OUTPATIENT)
Dept: ADMINISTRATIVE | Facility: HOSPITAL | Age: 70
End: 2025-07-24
Payer: MEDICARE

## 2025-07-30 ENCOUNTER — RESULTS FOLLOW-UP (OUTPATIENT)
Dept: FAMILY MEDICINE | Facility: CLINIC | Age: 70
End: 2025-07-30
Payer: MEDICARE

## 2025-07-30 DIAGNOSIS — R92.8 ABNORMAL MAMMOGRAM: Primary | ICD-10-CM

## (undated) DEVICE — SCISSOR CURVED ENDOPATH 5MM

## (undated) DEVICE — TUBING LAP SMOKE EVAC 6.5MM

## (undated) DEVICE — ADHESIVE DERMABOND ADVANCED

## (undated) DEVICE — SUT VICRYL+ 27 UR-6 VIOL

## (undated) DEVICE — Device

## (undated) DEVICE — GLOVE SENSICARE PI GRN 7

## (undated) DEVICE — ENDO GRASPER ETHICON 5DSG

## (undated) DEVICE — CATH CHOLANGIO 4.5F STL TP

## (undated) DEVICE — DISSECTOR CURVED 5DCD

## (undated) DEVICE — SOL CLEARIFY VISUALIZATION LAP

## (undated) DEVICE — GLOVE BIOGEL ECLIPSE SZ 7.5

## (undated) DEVICE — SYR ONLY LUER LOCK 20CC

## (undated) DEVICE — SUT MCRYL PLUS 4-0 PS2 27IN

## (undated) DEVICE — DRESSING TRANS 2X2 TEGADERM

## (undated) DEVICE — BAG SPEC RETRV ENDO 4X6IN DISP

## (undated) DEVICE — APPLICATOR CHLORAPREP ORN 26ML

## (undated) DEVICE — BINDER ABDOM 4PANEL 12IN LG/XL

## (undated) DEVICE — GLOVE SENSICARE PI SURG 6.5

## (undated) DEVICE — GLOVE SENSICARE PI SURG 7

## (undated) DEVICE — APPLIER CLIP ENDO LIGAMAX 5MM

## (undated) DEVICE — DRAPE ABDOMINAL TIBURON 14X11

## (undated) DEVICE — TROCAR BLDELSS 5X100 STABILITY

## (undated) DEVICE — CATH ANGIO IV 14GX5.25IN

## (undated) DEVICE — PENCIL ZIP PEN SMOKE EVAC 10FT

## (undated) DEVICE — DRAPE C-ARM ELAS CLIP 42X120IN

## (undated) DEVICE — SHEARS HARMONIC CRVD TIP 36CM

## (undated) DEVICE — GLOVE SENSICARE PI SURG 8.5

## (undated) DEVICE — IRRIGATOR SUCTION W/TIP

## (undated) DEVICE — TUBING INSUFFLATOR W/FILTR 10

## (undated) DEVICE — CANISTER SUCTION RIGID 3000CC

## (undated) DEVICE — ELECTRODE REM PLYHSV RETURN 9

## (undated) DEVICE — PAD CURAD NONADH 3X4IN

## (undated) DEVICE — CANNULA ENDOPATH XCEL 5X100MM

## (undated) DEVICE — DRAPE THREE-QUARTER 53X77IN